# Patient Record
Sex: FEMALE | Race: BLACK OR AFRICAN AMERICAN | NOT HISPANIC OR LATINO
[De-identification: names, ages, dates, MRNs, and addresses within clinical notes are randomized per-mention and may not be internally consistent; named-entity substitution may affect disease eponyms.]

---

## 2018-02-26 ENCOUNTER — TRANSCRIPTION ENCOUNTER (OUTPATIENT)
Age: 48
End: 2018-02-26

## 2018-06-10 ENCOUNTER — EMERGENCY (EMERGENCY)
Facility: HOSPITAL | Age: 48
LOS: 0 days | Discharge: HOME | End: 2018-06-11
Attending: EMERGENCY MEDICINE | Admitting: EMERGENCY MEDICINE

## 2018-06-10 VITALS
HEIGHT: 62 IN | SYSTOLIC BLOOD PRESSURE: 117 MMHG | RESPIRATION RATE: 18 BRPM | TEMPERATURE: 98 F | HEART RATE: 71 BPM | OXYGEN SATURATION: 99 % | DIASTOLIC BLOOD PRESSURE: 78 MMHG | WEIGHT: 225.09 LBS

## 2018-06-10 DIAGNOSIS — M25.511 PAIN IN RIGHT SHOULDER: ICD-10-CM
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
## 2018-06-10 DIAGNOSIS — R51 HEADACHE: ICD-10-CM
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
## 2018-06-10 DIAGNOSIS — Z79.1 LONG TERM (CURRENT) USE OF NON-STEROIDAL ANTI-INFLAMMATORIES (NSAID): ICD-10-CM

## 2018-06-10 DIAGNOSIS — Z79.899 OTHER LONG TERM (CURRENT) DRUG THERAPY: ICD-10-CM

## 2018-06-10 DIAGNOSIS — M54.9 DORSALGIA, UNSPECIFIED: ICD-10-CM

## 2018-06-10 DIAGNOSIS — M62.838 OTHER MUSCLE SPASM: ICD-10-CM

## 2018-06-10 DIAGNOSIS — M54.2 CERVICALGIA: ICD-10-CM

## 2018-06-10 DIAGNOSIS — M54.6 PAIN IN THORACIC SPINE: ICD-10-CM

## 2018-06-10 RX ORDER — ACETAMINOPHEN 500 MG
975 TABLET ORAL ONCE
Qty: 0 | Refills: 0 | Status: COMPLETED | OUTPATIENT
Start: 2018-06-10 | End: 2018-06-10

## 2018-06-10 RX ADMIN — Medication 975 MILLIGRAM(S): at 23:21
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
## 2018-06-11 NOTE — ED PROVIDER NOTE - PHYSICAL EXAMINATION
VITAL SIGNS: I have reviewed the initial vital signs.   CONSTITUTIONAL: Awake, alert. Well-developed; well-nourished; in no distress. Non-toxic appearing.   SKIN: No rash, vesicles/lesion, abrasions or lacerations. No ecchymosis or signs of trauma.   HEAD: Normocephalic; atraumatic. No step offs or tenderness. No Bui Sign’s or Raccoon eyes.   NECK: Supple. + b/l Trapezius ttp.   EYES: Symmetrical, no discharge or signs of trauma. Conjunctiva and sclera clear. PERRL.   CARD: No seatbelt sign. No chest wall deformity or tenderness. S1, S2 normal; no murmurs, gallops, or rubs. Regular rate and rhythm.  RESP: Good air movement. Lungs CTAB. No crackles, wheezes, rales or rhonchi.  ABD: Soft; non-distended; non-tender.   EXT: No bony deformity. + right shoulder ttp. Normal ROM x 4 extremities. + lower cervical/upper thoracic midline spinal ttp.   NEURO: A&Ox3. GCS 15. Normal speech. CN 2-12 intact. Strength 5/5 UE/LE b/l. No sensory deficits. n/v intact UE/LE b/l, pulses symmetrical. Normal gait.  PSYCH: Cooperative, appropriate.

## 2018-06-11 NOTE — ED PROVIDER NOTE - NS ED ROS FT
Except as documented in HPI, all other ROS negative.   GENERAL: Denies fever/chills, loss of appetite/weight or fatigue.  SKIN: Denies rashes, abrasions, lacerations, ecchymosis, erythema, or edema.  HEAD: + headache.  EYES: Denies blurry vision, diplopia, or photophobia.  CARDIAC: Denies chest pain, palpitations, or SOB.   RESPIRATORY: Denies SOB, cough, hemoptysis or wheezing.   GI: Denies abdominal pain, n/v/d.   MSK: Denies myalgias, bony deformity or pain.   NEURO: Denies paresthesias, tingling or weakness.
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
## 2018-10-12 PROBLEM — R56.9 UNSPECIFIED CONVULSIONS: Chronic | Status: ACTIVE | Noted: 2018-06-10

## 2018-11-29 ENCOUNTER — OUTPATIENT (OUTPATIENT)
Dept: OUTPATIENT SERVICES | Facility: HOSPITAL | Age: 48
LOS: 1 days | Discharge: HOME | End: 2018-11-29

## 2018-11-29 ENCOUNTER — RESULT REVIEW (OUTPATIENT)
Age: 48
End: 2018-11-29

## 2018-11-29 VITALS
WEIGHT: 235.01 LBS | SYSTOLIC BLOOD PRESSURE: 150 MMHG | TEMPERATURE: 97 F | RESPIRATION RATE: 18 BRPM | HEART RATE: 68 BPM | OXYGEN SATURATION: 100 % | HEIGHT: 61 IN | DIASTOLIC BLOOD PRESSURE: 89 MMHG

## 2018-11-29 VITALS — HEART RATE: 58 BPM | DIASTOLIC BLOOD PRESSURE: 58 MMHG | RESPIRATION RATE: 18 BRPM | SYSTOLIC BLOOD PRESSURE: 109 MMHG

## 2018-11-29 DIAGNOSIS — D26.9 OTHER BENIGN NEOPLASM OF UTERUS, UNSPECIFIED: Chronic | ICD-10-CM

## 2018-11-29 NOTE — ASU DISCHARGE PLAN (ADULT/PEDIATRIC). - NOTIFY
Unable to Urinate/Swelling that continues/Persistent Nausea and Vomiting/Numbness, color, or temperature change to extremity/Bleeding that does not stop/Fever greater than 101

## 2018-11-30 LAB — SURGICAL PATHOLOGY STUDY: SIGNIFICANT CHANGE UP

## 2018-12-04 DIAGNOSIS — Z12.11 ENCOUNTER FOR SCREENING FOR MALIGNANT NEOPLASM OF COLON: ICD-10-CM

## 2018-12-04 DIAGNOSIS — K64.0 FIRST DEGREE HEMORRHOIDS: ICD-10-CM

## 2018-12-04 DIAGNOSIS — K57.30 DIVERTICULOSIS OF LARGE INTESTINE WITHOUT PERFORATION OR ABSCESS WITHOUT BLEEDING: ICD-10-CM

## 2018-12-04 DIAGNOSIS — R56.9 UNSPECIFIED CONVULSIONS: ICD-10-CM

## 2019-07-16 PROBLEM — Z91.89 OTHER SPECIFIED PERSONAL RISK FACTORS, NOT ELSEWHERE CLASSIFIED: Chronic | Status: ACTIVE | Noted: 2018-11-29

## 2019-09-16 PROBLEM — Z00.00 ENCOUNTER FOR PREVENTIVE HEALTH EXAMINATION: Status: ACTIVE | Noted: 2019-09-16

## 2019-10-01 ENCOUNTER — APPOINTMENT (OUTPATIENT)
Dept: GASTROENTEROLOGY | Facility: CLINIC | Age: 49
End: 2019-10-01
Payer: MEDICAID

## 2019-10-01 VITALS
HEIGHT: 61 IN | WEIGHT: 260 LBS | BODY MASS INDEX: 49.09 KG/M2 | DIASTOLIC BLOOD PRESSURE: 78 MMHG | HEART RATE: 70 BPM | SYSTOLIC BLOOD PRESSURE: 108 MMHG

## 2019-10-01 DIAGNOSIS — R14.0 ABDOMINAL DISTENSION (GASEOUS): ICD-10-CM

## 2019-10-01 PROCEDURE — 99214 OFFICE O/P EST MOD 30 MIN: CPT

## 2019-10-01 RX ORDER — ASPIRIN 81 MG
81 TABLET, DELAYED RELEASE (ENTERIC COATED) ORAL
Refills: 0 | Status: ACTIVE | COMMUNITY

## 2019-10-01 RX ORDER — MELATONIN 5 MG
5 CAPSULE ORAL
Refills: 0 | Status: ACTIVE | COMMUNITY

## 2019-10-01 RX ORDER — CHROMIUM 200 MCG
TABLET ORAL
Refills: 0 | Status: ACTIVE | COMMUNITY

## 2019-10-01 NOTE — PHYSICAL EXAM
[General Appearance - In No Acute Distress] : in no acute distress [General Appearance - Alert] : alert [General Appearance - Well Nourished] : well nourished [General Appearance - Well Developed] : well developed [General Appearance - Well-Appearing] : healthy appearing [Sclera] : the sclera and conjunctiva were normal [PERRL With Normal Accommodation] : pupils were equal in size, round, and reactive to light [Extraocular Movements] : extraocular movements were intact [Outer Ear] : the ears and nose were normal in appearance [Oropharynx] : the oropharynx was normal [Neck Appearance] : the appearance of the neck was normal [Neck Cervical Mass (___cm)] : no neck mass was observed [Jugular Venous Distention Increased] : there was no jugular-venous distention [Thyroid Diffuse Enlargement] : the thyroid was not enlarged [Thyroid Nodule] : there were no palpable thyroid nodules [Respiration, Rhythm And Depth] : normal respiratory rhythm and effort [Auscultation Breath Sounds / Voice Sounds] : lungs were clear to auscultation bilaterally [Bowel Sounds] : normal bowel sounds [Abdomen Soft] : soft [Abdomen Tenderness] : non-tender [Abdomen Mass (___ Cm)] : no abdominal mass palpated [Skin Color & Pigmentation] : normal skin color and pigmentation [Skin Turgor] : normal skin turgor [] : no rash [Oriented To Time, Place, And Person] : oriented to person, place, and time [Affect] : the affect was normal [Impaired Insight] : insight and judgment were intact

## 2019-11-12 ENCOUNTER — TRANSCRIPTION ENCOUNTER (OUTPATIENT)
Age: 49
End: 2019-11-12

## 2020-01-06 ENCOUNTER — TRANSCRIPTION ENCOUNTER (OUTPATIENT)
Age: 50
End: 2020-01-06

## 2020-08-18 ENCOUNTER — APPOINTMENT (OUTPATIENT)
Dept: GASTROENTEROLOGY | Facility: CLINIC | Age: 50
End: 2020-08-18
Payer: MEDICAID

## 2020-08-18 ENCOUNTER — TRANSCRIPTION ENCOUNTER (OUTPATIENT)
Age: 50
End: 2020-08-18

## 2020-08-18 DIAGNOSIS — K29.70 GASTRITIS, UNSPECIFIED, W/OUT BLEEDING: ICD-10-CM

## 2020-08-18 DIAGNOSIS — R10.13 EPIGASTRIC PAIN: ICD-10-CM

## 2020-08-18 PROCEDURE — 99214 OFFICE O/P EST MOD 30 MIN: CPT | Mod: 95

## 2020-08-18 NOTE — HISTORY OF PRESENT ILLNESS
[Home] : at home, [unfilled] , at the time of the visit. [Medical Office: ([facility])___] : at the medical office located in  [FreeTextEntry4] : Ivy Hernadez [de-identified] : 51 yo female with minimal colitis on Colonoscopy here for abdominal pain and shortness of breath. Eating almonds last week and felt patient couldn’t breathe. Patient started having abdominal cramps since Saturday with epigastric and LUQ pain, patient went to urgent care today. Rectal exam with fecal blood. Patient reports pain is 6/10, patient able to tolerate clear liquids. No fevers no chills The patient denies  or black or bloody stool. The patient denies nausea, vomiting , dysphagia, odynophagia, or melena.. +Epigastric and LUQ pain.

## 2020-09-08 ENCOUNTER — OUTPATIENT (OUTPATIENT)
Dept: OUTPATIENT SERVICES | Facility: HOSPITAL | Age: 50
LOS: 1 days | Discharge: HOME | End: 2020-09-08

## 2020-09-08 ENCOUNTER — LABORATORY RESULT (OUTPATIENT)
Age: 50
End: 2020-09-08

## 2020-09-08 DIAGNOSIS — Z11.59 ENCOUNTER FOR SCREENING FOR OTHER VIRAL DISEASES: ICD-10-CM

## 2020-09-08 DIAGNOSIS — D26.9 OTHER BENIGN NEOPLASM OF UTERUS, UNSPECIFIED: Chronic | ICD-10-CM

## 2020-09-13 ENCOUNTER — OUTPATIENT (OUTPATIENT)
Dept: OUTPATIENT SERVICES | Facility: HOSPITAL | Age: 50
LOS: 1 days | Discharge: HOME | End: 2020-09-13

## 2020-09-13 ENCOUNTER — LABORATORY RESULT (OUTPATIENT)
Age: 50
End: 2020-09-13

## 2020-09-13 DIAGNOSIS — D26.9 OTHER BENIGN NEOPLASM OF UTERUS, UNSPECIFIED: Chronic | ICD-10-CM

## 2020-09-13 DIAGNOSIS — Z11.59 ENCOUNTER FOR SCREENING FOR OTHER VIRAL DISEASES: ICD-10-CM

## 2020-09-16 ENCOUNTER — OUTPATIENT (OUTPATIENT)
Dept: OUTPATIENT SERVICES | Facility: HOSPITAL | Age: 50
LOS: 1 days | Discharge: HOME | End: 2020-09-16
Payer: MEDICAID

## 2020-09-16 ENCOUNTER — RESULT REVIEW (OUTPATIENT)
Age: 50
End: 2020-09-16

## 2020-09-16 ENCOUNTER — TRANSCRIPTION ENCOUNTER (OUTPATIENT)
Age: 50
End: 2020-09-16

## 2020-09-16 VITALS
SYSTOLIC BLOOD PRESSURE: 105 MMHG | DIASTOLIC BLOOD PRESSURE: 62 MMHG | WEIGHT: 235.01 LBS | HEIGHT: 61 IN | OXYGEN SATURATION: 100 % | HEART RATE: 64 BPM | TEMPERATURE: 97 F | RESPIRATION RATE: 20 BRPM

## 2020-09-16 VITALS — RESPIRATION RATE: 18 BRPM | SYSTOLIC BLOOD PRESSURE: 117 MMHG | HEART RATE: 54 BPM

## 2020-09-16 DIAGNOSIS — K62.5 HEMORRHAGE OF ANUS AND RECTUM: ICD-10-CM

## 2020-09-16 DIAGNOSIS — D26.9 OTHER BENIGN NEOPLASM OF UTERUS, UNSPECIFIED: Chronic | ICD-10-CM

## 2020-09-16 PROCEDURE — 43239 EGD BIOPSY SINGLE/MULTIPLE: CPT

## 2020-09-16 PROCEDURE — 88312 SPECIAL STAINS GROUP 1: CPT | Mod: 26

## 2020-09-16 PROCEDURE — 88305 TISSUE EXAM BY PATHOLOGIST: CPT | Mod: 26

## 2020-09-16 NOTE — ASU DISCHARGE PLAN (ADULT/PEDIATRIC) - CARE PROVIDER_API CALL
Saturnino Abarca)  Gastroenterology; Internal Medicine  [address] [place] [address]  Phone: [phone]  Fax: [phone]  Follow Up Time: 1 week

## 2020-09-16 NOTE — ASU PREOP CHECKLIST - ANTIBIOTIC
"Chief Complaint   Patient presents with     Consult       Initial /78 (BP Location: Right arm, Patient Position: Sitting, Cuff Size: Adult Large)  Pulse 60  Ht 5' 10.87\" (1.8 m)  Wt 214 lb 9.6 oz (97.3 kg)  SpO2 97%  BMI 30.04 kg/m2 Estimated body mass index is 30.04 kg/(m^2) as calculated from the following:    Height as of this encounter: 5' 10.87\" (1.8 m).    Weight as of this encounter: 214 lb 9.6 oz (97.3 kg).  Medication Reconciliation: complete   Aida Moore MA      " n/a

## 2020-09-16 NOTE — PRE-ANESTHESIA EVALUATION ADULT - NSANTHOSAYNRD_GEN_A_CORE
No. KALLIE screening performed.  STOP BANG Legend: 0-2 = LOW Risk; 3-4 = INTERMEDIATE Risk; 5-8 = HIGH Risk

## 2020-09-17 LAB — SURGICAL PATHOLOGY STUDY: SIGNIFICANT CHANGE UP

## 2020-09-18 DIAGNOSIS — R56.9 UNSPECIFIED CONVULSIONS: ICD-10-CM

## 2020-09-18 DIAGNOSIS — K29.80 DUODENITIS WITHOUT BLEEDING: ICD-10-CM

## 2020-09-18 DIAGNOSIS — K29.50 UNSPECIFIED CHRONIC GASTRITIS WITHOUT BLEEDING: ICD-10-CM

## 2020-09-18 DIAGNOSIS — R10.9 UNSPECIFIED ABDOMINAL PAIN: ICD-10-CM

## 2020-09-21 ENCOUNTER — LABORATORY RESULT (OUTPATIENT)
Age: 50
End: 2020-09-21

## 2020-09-21 ENCOUNTER — OUTPATIENT (OUTPATIENT)
Dept: OUTPATIENT SERVICES | Facility: HOSPITAL | Age: 50
LOS: 1 days | Discharge: HOME | End: 2020-09-21

## 2020-09-21 DIAGNOSIS — D26.9 OTHER BENIGN NEOPLASM OF UTERUS, UNSPECIFIED: Chronic | ICD-10-CM

## 2020-09-21 DIAGNOSIS — Z11.59 ENCOUNTER FOR SCREENING FOR OTHER VIRAL DISEASES: ICD-10-CM

## 2020-09-24 ENCOUNTER — TRANSCRIPTION ENCOUNTER (OUTPATIENT)
Age: 50
End: 2020-09-24

## 2020-09-24 ENCOUNTER — OUTPATIENT (OUTPATIENT)
Dept: OUTPATIENT SERVICES | Facility: HOSPITAL | Age: 50
LOS: 1 days | Discharge: HOME | End: 2020-09-24
Payer: MEDICAID

## 2020-09-24 ENCOUNTER — RESULT REVIEW (OUTPATIENT)
Age: 50
End: 2020-09-24

## 2020-09-24 VITALS — DIASTOLIC BLOOD PRESSURE: 66 MMHG | RESPIRATION RATE: 18 BRPM | SYSTOLIC BLOOD PRESSURE: 110 MMHG | HEART RATE: 59 BPM

## 2020-09-24 VITALS
RESPIRATION RATE: 18 BRPM | SYSTOLIC BLOOD PRESSURE: 105 MMHG | TEMPERATURE: 98 F | DIASTOLIC BLOOD PRESSURE: 69 MMHG | HEART RATE: 65 BPM

## 2020-09-24 DIAGNOSIS — K59.09 OTHER CONSTIPATION: ICD-10-CM

## 2020-09-24 DIAGNOSIS — Z98.890 OTHER SPECIFIED POSTPROCEDURAL STATES: Chronic | ICD-10-CM

## 2020-09-24 DIAGNOSIS — D26.9 OTHER BENIGN NEOPLASM OF UTERUS, UNSPECIFIED: Chronic | ICD-10-CM

## 2020-09-24 PROCEDURE — 45380 COLONOSCOPY AND BIOPSY: CPT

## 2020-09-24 PROCEDURE — 88305 TISSUE EXAM BY PATHOLOGIST: CPT | Mod: 26

## 2020-09-24 RX ORDER — DICLOFENAC SODIUM 75 MG/1
0 TABLET, DELAYED RELEASE ORAL
Qty: 0 | Refills: 0 | DISCHARGE

## 2020-09-24 RX ORDER — LAMOTRIGINE 25 MG/1
1 TABLET, ORALLY DISINTEGRATING ORAL
Qty: 0 | Refills: 0 | DISCHARGE

## 2020-09-24 RX ORDER — MELOXICAM 15 MG/1
1 TABLET ORAL
Qty: 0 | Refills: 0 | DISCHARGE

## 2020-09-24 RX ORDER — TIZANIDINE 4 MG/1
2 TABLET ORAL
Qty: 0 | Refills: 0 | DISCHARGE

## 2020-09-24 NOTE — ASU PATIENT PROFILE, ADULT - VISION (WITH CORRECTIVE LENSES IF THE PATIENT USUALLY WEARS THEM):
Self Glucose Testinw1d   Type 2      Date   Fasting   After Breakfast   After  Lunch    After  Supper      7-1 95 109 121 112    7-2 89 101 100 122    7-3 82 95 80 101    7-4 91 110 121 120    7-5 80 116 111 105    7-6 93 83 93 120    7-7 83 107 116 123    7-8 91 110 112 120    7-9 98           Patient has been taking her blood sugars 2 hours post meals.  Blood sugar numbers and regimen reviewed by Gina Mcclendon MD     Current Regimen:Metformin 1000 mg qam and 1500 mg qhs  Levemir 38 u at hs  Humalog 10U before dinner    Patient thought she was told to take 38U not 32 as previously documented. Patient aware to continue with 38U as her blood sugars are in the normal range.    Explained to patient that Robyn DORADO will be following patient regarding her diabetes but all her appointments and ultrasounds will be here in [place]. Patient given number to call today to establish with Robyn.  Patient agreeable to plan of care.     Normal vision: sees adequately in most situations; can see medication labels, newsprint

## 2020-09-24 NOTE — ASU PATIENT PROFILE, ADULT - PSH
Atypical polypoid adenomyoma of uterus    H/O breast biopsy  left 1990's  H/O colonoscopy  1 year ago

## 2020-09-24 NOTE — ASU PATIENT PROFILE, ADULT - PMH
Arthritis    At high risk for seizures    Back injury    Left knee injury  tear  Right shoulder injury  tear  Seizure

## 2020-09-24 NOTE — ASU DISCHARGE PLAN (ADULT/PEDIATRIC) - CARE PROVIDER_API CALL
Saturnino Abarca)  Gastroenterology; Internal Medicine  [address] [place] [address]  Phone: [phone]  Fax: [phone]  Established Patient  Follow Up Time: 2 weeks

## 2020-09-29 LAB — SURGICAL PATHOLOGY STUDY: SIGNIFICANT CHANGE UP

## 2020-09-30 DIAGNOSIS — K92.1 MELENA: ICD-10-CM

## 2020-09-30 DIAGNOSIS — K64.8 OTHER HEMORRHOIDS: ICD-10-CM

## 2020-09-30 DIAGNOSIS — K63.3 ULCER OF INTESTINE: ICD-10-CM

## 2020-09-30 DIAGNOSIS — K57.30 DIVERTICULOSIS OF LARGE INTESTINE WITHOUT PERFORATION OR ABSCESS WITHOUT BLEEDING: ICD-10-CM

## 2020-09-30 DIAGNOSIS — D12.5 BENIGN NEOPLASM OF SIGMOID COLON: ICD-10-CM

## 2021-04-20 ENCOUNTER — TRANSCRIPTION ENCOUNTER (OUTPATIENT)
Age: 51
End: 2021-04-20

## 2021-06-09 PROBLEM — M19.90 UNSPECIFIED OSTEOARTHRITIS, UNSPECIFIED SITE: Chronic | Status: ACTIVE | Noted: 2020-09-24

## 2021-06-09 PROBLEM — S39.92XA UNSPECIFIED INJURY OF LOWER BACK, INITIAL ENCOUNTER: Chronic | Status: ACTIVE | Noted: 2020-09-24

## 2021-06-09 PROBLEM — S49.91XA UNSPECIFIED INJURY OF RIGHT SHOULDER AND UPPER ARM, INITIAL ENCOUNTER: Chronic | Status: ACTIVE | Noted: 2020-09-24

## 2021-06-09 PROBLEM — S89.92XA UNSPECIFIED INJURY OF LEFT LOWER LEG, INITIAL ENCOUNTER: Chronic | Status: ACTIVE | Noted: 2020-09-24

## 2021-06-13 ENCOUNTER — TRANSCRIPTION ENCOUNTER (OUTPATIENT)
Age: 51
End: 2021-06-13

## 2021-06-13 ENCOUNTER — EMERGENCY (EMERGENCY)
Facility: HOSPITAL | Age: 51
LOS: 0 days | Discharge: HOME | End: 2021-06-13
Attending: EMERGENCY MEDICINE | Admitting: EMERGENCY MEDICINE
Payer: MEDICAID

## 2021-06-13 VITALS
WEIGHT: 250 LBS | RESPIRATION RATE: 18 BRPM | OXYGEN SATURATION: 99 % | HEART RATE: 60 BPM | SYSTOLIC BLOOD PRESSURE: 127 MMHG | TEMPERATURE: 98 F | HEIGHT: 61 IN | DIASTOLIC BLOOD PRESSURE: 71 MMHG

## 2021-06-13 VITALS — TEMPERATURE: 98 F | DIASTOLIC BLOOD PRESSURE: 68 MMHG | SYSTOLIC BLOOD PRESSURE: 120 MMHG | HEART RATE: 53 BPM

## 2021-06-13 DIAGNOSIS — Z79.82 LONG TERM (CURRENT) USE OF ASPIRIN: ICD-10-CM

## 2021-06-13 DIAGNOSIS — R07.89 OTHER CHEST PAIN: ICD-10-CM

## 2021-06-13 DIAGNOSIS — Z79.899 OTHER LONG TERM (CURRENT) DRUG THERAPY: ICD-10-CM

## 2021-06-13 DIAGNOSIS — G40.909 EPILEPSY, UNSPECIFIED, NOT INTRACTABLE, WITHOUT STATUS EPILEPTICUS: ICD-10-CM

## 2021-06-13 DIAGNOSIS — Z98.890 OTHER SPECIFIED POSTPROCEDURAL STATES: Chronic | ICD-10-CM

## 2021-06-13 DIAGNOSIS — D26.9 OTHER BENIGN NEOPLASM OF UTERUS, UNSPECIFIED: Chronic | ICD-10-CM

## 2021-06-13 DIAGNOSIS — M19.90 UNSPECIFIED OSTEOARTHRITIS, UNSPECIFIED SITE: ICD-10-CM

## 2021-06-13 LAB
ALBUMIN SERPL ELPH-MCNC: 4.3 G/DL — SIGNIFICANT CHANGE UP (ref 3.5–5.2)
ALP SERPL-CCNC: 76 U/L — SIGNIFICANT CHANGE UP (ref 30–115)
ALT FLD-CCNC: 8 U/L — SIGNIFICANT CHANGE UP (ref 0–41)
ANION GAP SERPL CALC-SCNC: 8 MMOL/L — SIGNIFICANT CHANGE UP (ref 7–14)
AST SERPL-CCNC: 12 U/L — SIGNIFICANT CHANGE UP (ref 0–41)
BASOPHILS # BLD AUTO: 0.01 K/UL — SIGNIFICANT CHANGE UP (ref 0–0.2)
BASOPHILS NFR BLD AUTO: 0.2 % — SIGNIFICANT CHANGE UP (ref 0–1)
BILIRUB SERPL-MCNC: 0.2 MG/DL — SIGNIFICANT CHANGE UP (ref 0.2–1.2)
BUN SERPL-MCNC: 13 MG/DL — SIGNIFICANT CHANGE UP (ref 10–20)
CALCIUM SERPL-MCNC: 9.3 MG/DL — SIGNIFICANT CHANGE UP (ref 8.5–10.1)
CHLORIDE SERPL-SCNC: 101 MMOL/L — SIGNIFICANT CHANGE UP (ref 98–110)
CO2 SERPL-SCNC: 28 MMOL/L — SIGNIFICANT CHANGE UP (ref 17–32)
CREAT SERPL-MCNC: 0.9 MG/DL — SIGNIFICANT CHANGE UP (ref 0.7–1.5)
D DIMER BLD IA.RAPID-MCNC: 58 NG/ML DDU — SIGNIFICANT CHANGE UP (ref 0–230)
EOSINOPHIL # BLD AUTO: 0.04 K/UL — SIGNIFICANT CHANGE UP (ref 0–0.7)
EOSINOPHIL NFR BLD AUTO: 0.8 % — SIGNIFICANT CHANGE UP (ref 0–8)
GLUCOSE SERPL-MCNC: 84 MG/DL — SIGNIFICANT CHANGE UP (ref 70–99)
HCG SERPL QL: NEGATIVE — SIGNIFICANT CHANGE UP
HCT VFR BLD CALC: 37.5 % — SIGNIFICANT CHANGE UP (ref 37–47)
HGB BLD-MCNC: 12.3 G/DL — SIGNIFICANT CHANGE UP (ref 12–16)
IMM GRANULOCYTES NFR BLD AUTO: 0.2 % — SIGNIFICANT CHANGE UP (ref 0.1–0.3)
LYMPHOCYTES # BLD AUTO: 2.22 K/UL — SIGNIFICANT CHANGE UP (ref 1.2–3.4)
LYMPHOCYTES # BLD AUTO: 47.1 % — SIGNIFICANT CHANGE UP (ref 20.5–51.1)
MCHC RBC-ENTMCNC: 29.3 PG — SIGNIFICANT CHANGE UP (ref 27–31)
MCHC RBC-ENTMCNC: 32.8 G/DL — SIGNIFICANT CHANGE UP (ref 32–37)
MCV RBC AUTO: 89.3 FL — SIGNIFICANT CHANGE UP (ref 81–99)
MONOCYTES # BLD AUTO: 0.47 K/UL — SIGNIFICANT CHANGE UP (ref 0.1–0.6)
MONOCYTES NFR BLD AUTO: 10 % — HIGH (ref 1.7–9.3)
NEUTROPHILS # BLD AUTO: 1.96 K/UL — SIGNIFICANT CHANGE UP (ref 1.4–6.5)
NEUTROPHILS NFR BLD AUTO: 41.7 % — LOW (ref 42.2–75.2)
NRBC # BLD: 0 /100 WBCS — SIGNIFICANT CHANGE UP (ref 0–0)
PLATELET # BLD AUTO: 257 K/UL — SIGNIFICANT CHANGE UP (ref 130–400)
POTASSIUM SERPL-MCNC: 4.2 MMOL/L — SIGNIFICANT CHANGE UP (ref 3.5–5)
POTASSIUM SERPL-SCNC: 4.2 MMOL/L — SIGNIFICANT CHANGE UP (ref 3.5–5)
PROT SERPL-MCNC: 6.8 G/DL — SIGNIFICANT CHANGE UP (ref 6–8)
RBC # BLD: 4.2 M/UL — SIGNIFICANT CHANGE UP (ref 4.2–5.4)
RBC # FLD: 12.7 % — SIGNIFICANT CHANGE UP (ref 11.5–14.5)
SODIUM SERPL-SCNC: 137 MMOL/L — SIGNIFICANT CHANGE UP (ref 135–146)
TROPONIN T SERPL-MCNC: <0.01 NG/ML — SIGNIFICANT CHANGE UP
WBC # BLD: 4.71 K/UL — LOW (ref 4.8–10.8)
WBC # FLD AUTO: 4.71 K/UL — LOW (ref 4.8–10.8)

## 2021-06-13 PROCEDURE — 93010 ELECTROCARDIOGRAM REPORT: CPT

## 2021-06-13 PROCEDURE — 99285 EMERGENCY DEPT VISIT HI MDM: CPT

## 2021-06-13 PROCEDURE — 71045 X-RAY EXAM CHEST 1 VIEW: CPT | Mod: 26

## 2021-06-13 NOTE — ED PROVIDER NOTE - PATIENT PORTAL LINK FT
You can access the FollowMyHealth Patient Portal offered by [facility] by registering at the following website: [email or web address]. By joining [employer]’s FollowMyHealth portal, you will also be able to view your health information using other applications (apps) compatible with our system.

## 2021-06-13 NOTE — ED PROVIDER NOTE - PROGRESS NOTE DETAILS
SC: Labs, EKG, CXR reassuring. PT offered observation but states that she does not want to stay. Strict return precautions were advised for worsening chest pain, new or worsening sx.    HEART Score for Major Adverse Cardiac Events (MACE) =2    History suspicion: slight (0)/moderate (1)/high (2)  EKG: normal (0)/non-specific repolarization disturbance (1)/significant ST depression (2)  Age: <45 (0)/45-64 (1)/=65 (2)  Risk factors (HTN, hypercholesterolemia, DM, obesity, smoking, family history, atherosclerosis): 0 (0)/1-2 (1)/=3 (2)  Troponin: normal (0)/1-2x normal limit (1)/>2x normal limit (2)    ***Interpretation  Risk at 6 weeks of MACE (all-cause mortality, myocardial infarction, or coronary revascularization) is:  0-3 Points: 0.9-1.7%  4-6 Points: 12-16.6%  7-10 Points: 50-65%  ***

## 2021-06-13 NOTE — ED PROVIDER NOTE - OBJECTIVE STATEMENT
51F with past medical history of epilepsy on keppra and lamictil presents with moderate, continuous, nonradiating chest pressure beginning at 7AM after waking. Denies fever, chills, dysuria, shortness of breath, n/v, abdominal pain, use of exogenous hormones, LE pain or swelling. Recent negative nuc stress and treadmill test with Dr. Allen in [place] 3 mo ago.

## 2021-06-13 NOTE — ED PROVIDER NOTE - ATTENDING CONTRIBUTION TO CARE
51 year old female, pmhx as documented presenting with chest pain that began at 7am this morning described as tight, substernal, non-radiating, no palliative or provocative factors, mild severity. Patient has had nuclear stress test 3 months ago which was negative. Otherwise denies fevers, dyspnea, palpitations, N/V/D, blood in stool, abdominal pain or leg swelling. Patient states pain has largely resolved.    Vital Signs: I have reviewed the initial vital signs.  Constitutional: NAD, well-nourished, appears stated age, no acute distress.  HEENT: Airway patent, moist MM, no erythema/swelling/deformity of oral structures. EOMI, PERRLA.  CV: regular rate, regular rhythm, well-perfused extremities, 2+ b/l DP and radial pulses equal.  Lungs: BCTA, no increased WOB.  ABD: NTND, no guarding or rebound, no pulsatile mass, no hernias.   MSK: Neck supple, nontender, nl ROM, no stepoff. Chest nontender. Back nontender in TLS spine or to b/l bony structures or flanks. Ext nontender, nl rom, no deformity.   INTEG: Skin warm, dry, no rash.  NEURO: A&Ox3, normal strength, nl sensation throughout, normal speech.   PSYCH: Calm, cooperative, normal affect and interaction.    Will obtain EKG, CXR, labs, re-eval.

## 2021-06-13 NOTE — ED PROVIDER NOTE - NSFOLLOWUPCLINICS_GEN_ALL_ED_FT
[facility]  Medicine  [address], [place]   Phone: [phone]  Fax:   Follow Up Time: 1-3 Days

## 2021-06-13 NOTE — ED PROVIDER NOTE - PHYSICAL EXAMINATION
CONSTITUTIONAL: in no acute distress.   SKIN: warm, dry  HEAD: Normocephalic.  EYES: PERRL.  ENT: Airway clear.  NECK: Supple.  LYMPH: No acute cervical adenopathy.  CARD: Regular rate and rhythm.   RESP: No wheezing, rales or rhonchi.  ABD: soft ntnd  EXT: No clubbing, cyanosis. No LE TTP  NEURO: Alert, oriented.  PSYCH: Cooperative, appropriate.

## 2021-06-13 NOTE — ED PROVIDER NOTE - CLINICAL SUMMARY MEDICAL DECISION MAKING FREE TEXT BOX
Patient presented with chest pain since this AM. Otherwise afebrile, Hd stable, well appearing. EKG obtained and non-ischemic without evidence of STEMI. Obtained labs which were grossly unremarkable including no significant leukocytosis, anemia, signs of dehydration/JOHN, transaminitis or significant electrolyte abnormalities. Trop and d-dimer negative. Chest xray negative for pneumothorax, pneumonia, widened mediastinum, evidence of rib fractures, enlarged cardiac silhouette or any other emergent pathologies. Patient remained without recurrence of chest pain or abnormalities during monitoring in ED. Ambulatory without difficulty, tolerates PO. HEART Score 1 with recent negative nuclear stress testing, and no concern clinically for dissection. Given the above, will discharge home with outpatient follow up. Patient agreeable with plan. Agrees to return to ED for any new or worsening symptoms.

## 2021-06-25 NOTE — ASU PATIENT PROFILE, ADULT - MEDICATION ADMINISTRATION INFO, PROFILE
Lab Results   Component Value Date    CHOL 135 05/08/2019    CHOL 83 05/08/2019    TRIG 94 05/08/2019    HDL 52 05/08/2019    HDL 52 05/21/2012    LDLCALC 65 05/08/2019     Meds~ crestor / CoQ 10  Plan~ no changes no concerns

## 2021-07-01 ENCOUNTER — APPOINTMENT (OUTPATIENT)
Dept: OTOLARYNGOLOGY | Facility: CLINIC | Age: 51
End: 2021-07-01
Payer: MEDICAID

## 2021-07-01 VITALS — HEIGHT: 61 IN | WEIGHT: 250 LBS | BODY MASS INDEX: 47.2 KG/M2

## 2021-07-01 DIAGNOSIS — H69.81 OTHER SPECIFIED DISORDERS OF EUSTACHIAN TUBE, RIGHT EAR: ICD-10-CM

## 2021-07-01 DIAGNOSIS — J34.3 HYPERTROPHY OF NASAL TURBINATES: ICD-10-CM

## 2021-07-01 DIAGNOSIS — J34.89 OTHER SPECIFIED DISORDERS OF NOSE AND NASAL SINUSES: ICD-10-CM

## 2021-07-01 DIAGNOSIS — H91.91 UNSPECIFIED HEARING LOSS, RIGHT EAR: ICD-10-CM

## 2021-07-01 DIAGNOSIS — H66.90 OTITIS MEDIA, UNSPECIFIED, UNSPECIFIED EAR: ICD-10-CM

## 2021-07-01 PROCEDURE — 31231 NASAL ENDOSCOPY DX: CPT

## 2021-07-01 PROCEDURE — 92557 COMPREHENSIVE HEARING TEST: CPT

## 2021-07-01 PROCEDURE — 92550 TYMPANOMETRY & REFLEX THRESH: CPT

## 2021-07-01 PROCEDURE — 99204 OFFICE O/P NEW MOD 45 MIN: CPT | Mod: 25

## 2021-07-01 NOTE — HISTORY OF PRESENT ILLNESS
[de-identified] : Patient present  today c/o ear infections. On 5/1 she had bilateral ear infections, the left worse that the right. Given   Augmentin 875 mg and  Zyrtec.  She continues to feel right ear has been clogged and has ear pain.\par She also c/o right sided nasal congestion. She used flonase for 2 weeks with no improvement

## 2021-07-01 NOTE — PHYSICAL EXAM
[Nasal Endoscopy Performed] : nasal endoscopy was performed, see procedure section for findings [Midline] : trachea located in midline position [Normal] : no abnormal secretions [de-identified] : severe edema

## 2021-07-01 NOTE — REASON FOR VISIT
[Initial Evaluation] : an initial evaluation for [FreeTextEntry2] : ear infections and nasal congestion

## 2021-07-19 NOTE — ED PROVIDER NOTE - NSCAREINITIATED _GEN_ER
Vascular Surgery Discharge Instructions: Care after your Fistula Procedure    Activity  For the next 24 hours:  Follow these guidelines related to the sedation medicine that you've received.   · You must have someone drive you home.  · You may feel tired, unsteady, or not quite yourself for the remainder of the day due to the sedation medicine. Your body gets rid of the medicine usually in 24 hours.  · Do not drive or operate machinery or power tools.  · Do not drink alcohol or smoke.  · Do not make any important decisions or sign important papers.    Follow these guidelines related to the surgery site  · No pushing, pulling, strenuous activity or heavy lifting (over 10 lbs) for 2 days with the arm used for the procedure.  · No driving for 2 days following the procedure.    Care of the procedure site.  · You may remove the dressing 48 hours after your procedure. Once the dressing is removed you can wash the access site with soap and water and pay gently dry. There is no specific puncture site/incision care required. Keep the site clean and dry.    Common side effects you may notice  · Soreness at the puncture site.  · Slight bruising at the site for several days to several weeks.  · Lump the size of a pea or marble at the puncture site for a few weeks.    Diet/Fluids  · Resume the same diet as prior to admission.    Call your doctor if you have  · Numbness, tingling, color change or swelling in the arm used for the procedure.  · Increasing pain near the procedure site.  · A temperature greater than 100.5F degrees.  · Redness or drainage around the procedure site.  · If you have questions, call your doctor.    *Your dialysis center may continue to use your left arm fistula for dialysis.     *Dr. Hdez's office will call you to schedule your next fistula procedure.       Dr. Ricky Hdez  [phone]     Marva Madrid)

## 2021-07-21 ENCOUNTER — APPOINTMENT (OUTPATIENT)
Dept: OTOLARYNGOLOGY | Facility: CLINIC | Age: 51
End: 2021-07-21

## 2022-01-28 ENCOUNTER — TRANSCRIPTION ENCOUNTER (OUTPATIENT)
Age: 52
End: 2022-01-28

## 2022-04-03 ENCOUNTER — INPATIENT (INPATIENT)
Facility: HOSPITAL | Age: 52
LOS: 1 days | Discharge: HOME | End: 2022-04-05
Attending: INTERNAL MEDICINE | Admitting: INTERNAL MEDICINE
Payer: MEDICARE

## 2022-04-03 VITALS
DIASTOLIC BLOOD PRESSURE: 60 MMHG | HEART RATE: 70 BPM | OXYGEN SATURATION: 100 % | RESPIRATION RATE: 18 BRPM | TEMPERATURE: 98 F | SYSTOLIC BLOOD PRESSURE: 123 MMHG | HEIGHT: 61 IN | WEIGHT: 262.35 LBS

## 2022-04-03 DIAGNOSIS — Z98.890 OTHER SPECIFIED POSTPROCEDURAL STATES: Chronic | ICD-10-CM

## 2022-04-03 DIAGNOSIS — D26.9 OTHER BENIGN NEOPLASM OF UTERUS, UNSPECIFIED: Chronic | ICD-10-CM

## 2022-04-03 LAB
ALBUMIN SERPL ELPH-MCNC: 4.2 G/DL — SIGNIFICANT CHANGE UP (ref 3.5–5.2)
ALBUMIN SERPL ELPH-MCNC: 4.3 G/DL — SIGNIFICANT CHANGE UP (ref 3.5–5.2)
ALP SERPL-CCNC: 93 U/L — SIGNIFICANT CHANGE UP (ref 30–115)
ALP SERPL-CCNC: 98 U/L — SIGNIFICANT CHANGE UP (ref 30–115)
ALT FLD-CCNC: 10 U/L — SIGNIFICANT CHANGE UP (ref 0–41)
ALT FLD-CCNC: 11 U/L — SIGNIFICANT CHANGE UP (ref 0–41)
ANION GAP SERPL CALC-SCNC: 10 MMOL/L — SIGNIFICANT CHANGE UP (ref 7–14)
ANION GAP SERPL CALC-SCNC: 11 MMOL/L — SIGNIFICANT CHANGE UP (ref 7–14)
APTT BLD: 32.6 SEC — SIGNIFICANT CHANGE UP (ref 27–39.2)
AST SERPL-CCNC: 13 U/L — SIGNIFICANT CHANGE UP (ref 0–41)
AST SERPL-CCNC: 13 U/L — SIGNIFICANT CHANGE UP (ref 0–41)
BASOPHILS # BLD AUTO: 0.02 K/UL — SIGNIFICANT CHANGE UP (ref 0–0.2)
BASOPHILS # BLD AUTO: 0.02 K/UL — SIGNIFICANT CHANGE UP (ref 0–0.2)
BASOPHILS NFR BLD AUTO: 0.3 % — SIGNIFICANT CHANGE UP (ref 0–1)
BASOPHILS NFR BLD AUTO: 0.3 % — SIGNIFICANT CHANGE UP (ref 0–1)
BILIRUB SERPL-MCNC: 0.3 MG/DL — SIGNIFICANT CHANGE UP (ref 0.2–1.2)
BILIRUB SERPL-MCNC: 0.3 MG/DL — SIGNIFICANT CHANGE UP (ref 0.2–1.2)
BUN SERPL-MCNC: 15 MG/DL — SIGNIFICANT CHANGE UP (ref 10–20)
BUN SERPL-MCNC: 19 MG/DL — SIGNIFICANT CHANGE UP (ref 10–20)
CALCIUM SERPL-MCNC: 9.1 MG/DL — SIGNIFICANT CHANGE UP (ref 8.5–10.1)
CALCIUM SERPL-MCNC: 9.4 MG/DL — SIGNIFICANT CHANGE UP (ref 8.5–10.1)
CHLORIDE SERPL-SCNC: 103 MMOL/L — SIGNIFICANT CHANGE UP (ref 98–110)
CHLORIDE SERPL-SCNC: 99 MMOL/L — SIGNIFICANT CHANGE UP (ref 98–110)
CHOLEST SERPL-MCNC: 120 MG/DL — SIGNIFICANT CHANGE UP
CO2 SERPL-SCNC: 23 MMOL/L — SIGNIFICANT CHANGE UP (ref 17–32)
CO2 SERPL-SCNC: 24 MMOL/L — SIGNIFICANT CHANGE UP (ref 17–32)
CREAT SERPL-MCNC: 1.1 MG/DL — SIGNIFICANT CHANGE UP (ref 0.7–1.5)
CREAT SERPL-MCNC: 1.2 MG/DL — SIGNIFICANT CHANGE UP (ref 0.7–1.5)
EGFR: 54 ML/MIN/1.73M2 — LOW
EGFR: 60 ML/MIN/1.73M2 — SIGNIFICANT CHANGE UP
EOSINOPHIL # BLD AUTO: 0.12 K/UL — SIGNIFICANT CHANGE UP (ref 0–0.7)
EOSINOPHIL # BLD AUTO: 0.18 K/UL — SIGNIFICANT CHANGE UP (ref 0–0.7)
EOSINOPHIL NFR BLD AUTO: 1.8 % — SIGNIFICANT CHANGE UP (ref 0–8)
EOSINOPHIL NFR BLD AUTO: 2.3 % — SIGNIFICANT CHANGE UP (ref 0–8)
GLUCOSE SERPL-MCNC: 90 MG/DL — SIGNIFICANT CHANGE UP (ref 70–99)
GLUCOSE SERPL-MCNC: 92 MG/DL — SIGNIFICANT CHANGE UP (ref 70–99)
HCT VFR BLD CALC: 36.1 % — LOW (ref 37–47)
HCT VFR BLD CALC: 37.3 % — SIGNIFICANT CHANGE UP (ref 37–47)
HDLC SERPL-MCNC: 59 MG/DL — SIGNIFICANT CHANGE UP
HGB BLD-MCNC: 11.6 G/DL — LOW (ref 12–16)
HGB BLD-MCNC: 12.1 G/DL — SIGNIFICANT CHANGE UP (ref 12–16)
IMM GRANULOCYTES NFR BLD AUTO: 0.2 % — SIGNIFICANT CHANGE UP (ref 0.1–0.3)
IMM GRANULOCYTES NFR BLD AUTO: 0.3 % — SIGNIFICANT CHANGE UP (ref 0.1–0.3)
INR BLD: 1.03 RATIO — SIGNIFICANT CHANGE UP (ref 0.65–1.3)
LIPID PNL WITH DIRECT LDL SERPL: 57 MG/DL — SIGNIFICANT CHANGE UP
LYMPHOCYTES # BLD AUTO: 2.53 K/UL — SIGNIFICANT CHANGE UP (ref 1.2–3.4)
LYMPHOCYTES # BLD AUTO: 3.08 K/UL — SIGNIFICANT CHANGE UP (ref 1.2–3.4)
LYMPHOCYTES # BLD AUTO: 38.3 % — SIGNIFICANT CHANGE UP (ref 20.5–51.1)
LYMPHOCYTES # BLD AUTO: 39.2 % — SIGNIFICANT CHANGE UP (ref 20.5–51.1)
MAGNESIUM SERPL-MCNC: 2 MG/DL — SIGNIFICANT CHANGE UP (ref 1.8–2.4)
MCHC RBC-ENTMCNC: 28.3 PG — SIGNIFICANT CHANGE UP (ref 27–31)
MCHC RBC-ENTMCNC: 29 PG — SIGNIFICANT CHANGE UP (ref 27–31)
MCHC RBC-ENTMCNC: 32.1 G/DL — SIGNIFICANT CHANGE UP (ref 32–37)
MCHC RBC-ENTMCNC: 32.4 G/DL — SIGNIFICANT CHANGE UP (ref 32–37)
MCV RBC AUTO: 88 FL — SIGNIFICANT CHANGE UP (ref 81–99)
MCV RBC AUTO: 89.4 FL — SIGNIFICANT CHANGE UP (ref 81–99)
MONOCYTES # BLD AUTO: 0.52 K/UL — SIGNIFICANT CHANGE UP (ref 0.1–0.6)
MONOCYTES # BLD AUTO: 0.63 K/UL — HIGH (ref 0.1–0.6)
MONOCYTES NFR BLD AUTO: 7.9 % — SIGNIFICANT CHANGE UP (ref 1.7–9.3)
MONOCYTES NFR BLD AUTO: 8 % — SIGNIFICANT CHANGE UP (ref 1.7–9.3)
NEUTROPHILS # BLD AUTO: 3.41 K/UL — SIGNIFICANT CHANGE UP (ref 1.4–6.5)
NEUTROPHILS # BLD AUTO: 3.92 K/UL — SIGNIFICANT CHANGE UP (ref 1.4–6.5)
NEUTROPHILS NFR BLD AUTO: 49.9 % — SIGNIFICANT CHANGE UP (ref 42.2–75.2)
NEUTROPHILS NFR BLD AUTO: 51.5 % — SIGNIFICANT CHANGE UP (ref 42.2–75.2)
NON HDL CHOLESTEROL: 61 MG/DL — SIGNIFICANT CHANGE UP
NRBC # BLD: 0 /100 WBCS — SIGNIFICANT CHANGE UP (ref 0–0)
NRBC # BLD: 0 /100 WBCS — SIGNIFICANT CHANGE UP (ref 0–0)
PLATELET # BLD AUTO: 281 K/UL — SIGNIFICANT CHANGE UP (ref 130–400)
PLATELET # BLD AUTO: 283 K/UL — SIGNIFICANT CHANGE UP (ref 130–400)
POTASSIUM SERPL-MCNC: 4 MMOL/L — SIGNIFICANT CHANGE UP (ref 3.5–5)
POTASSIUM SERPL-MCNC: 4.3 MMOL/L — SIGNIFICANT CHANGE UP (ref 3.5–5)
POTASSIUM SERPL-SCNC: 4 MMOL/L — SIGNIFICANT CHANGE UP (ref 3.5–5)
POTASSIUM SERPL-SCNC: 4.3 MMOL/L — SIGNIFICANT CHANGE UP (ref 3.5–5)
PROT SERPL-MCNC: 6.5 G/DL — SIGNIFICANT CHANGE UP (ref 6–8)
PROT SERPL-MCNC: 6.6 G/DL — SIGNIFICANT CHANGE UP (ref 6–8)
PROTHROM AB SERPL-ACNC: 11.8 SEC — SIGNIFICANT CHANGE UP (ref 9.95–12.87)
RBC # BLD: 4.1 M/UL — LOW (ref 4.2–5.4)
RBC # BLD: 4.17 M/UL — LOW (ref 4.2–5.4)
RBC # FLD: 13.4 % — SIGNIFICANT CHANGE UP (ref 11.5–14.5)
RBC # FLD: 13.5 % — SIGNIFICANT CHANGE UP (ref 11.5–14.5)
SARS-COV-2 RNA SPEC QL NAA+PROBE: SIGNIFICANT CHANGE UP
SODIUM SERPL-SCNC: 133 MMOL/L — LOW (ref 135–146)
SODIUM SERPL-SCNC: 137 MMOL/L — SIGNIFICANT CHANGE UP (ref 135–146)
TRIGL SERPL-MCNC: 40 MG/DL — SIGNIFICANT CHANGE UP
TROPONIN T SERPL-MCNC: <0.01 NG/ML — SIGNIFICANT CHANGE UP
WBC # BLD: 6.61 K/UL — SIGNIFICANT CHANGE UP (ref 4.8–10.8)
WBC # BLD: 7.85 K/UL — SIGNIFICANT CHANGE UP (ref 4.8–10.8)
WBC # FLD AUTO: 6.61 K/UL — SIGNIFICANT CHANGE UP (ref 4.8–10.8)
WBC # FLD AUTO: 7.85 K/UL — SIGNIFICANT CHANGE UP (ref 4.8–10.8)

## 2022-04-03 PROCEDURE — 70498 CT ANGIOGRAPHY NECK: CPT | Mod: 26

## 2022-04-03 PROCEDURE — 70450 CT HEAD/BRAIN W/O DYE: CPT | Mod: 26,MA,59

## 2022-04-03 PROCEDURE — 99291 CRITICAL CARE FIRST HOUR: CPT

## 2022-04-03 PROCEDURE — 99233 SBSQ HOSP IP/OBS HIGH 50: CPT

## 2022-04-03 PROCEDURE — 0042T: CPT

## 2022-04-03 PROCEDURE — G0426: CPT

## 2022-04-03 PROCEDURE — ZZZZZ: CPT

## 2022-04-03 PROCEDURE — 70496 CT ANGIOGRAPHY HEAD: CPT | Mod: 26

## 2022-04-03 PROCEDURE — 73030 X-RAY EXAM OF SHOULDER: CPT | Mod: 26,RT

## 2022-04-03 PROCEDURE — 99221 1ST HOSP IP/OBS SF/LOW 40: CPT

## 2022-04-03 PROCEDURE — 93010 ELECTROCARDIOGRAM REPORT: CPT

## 2022-04-03 RX ORDER — FREMANEZUMAB-VFRM 225 MG/1.5ML
1.5 INJECTION SUBCUTANEOUS
Qty: 0 | Refills: 0 | DISCHARGE

## 2022-04-03 RX ORDER — LAMOTRIGINE 25 MG/1
250 TABLET, ORALLY DISINTEGRATING ORAL
Qty: 0 | Refills: 0 | DISCHARGE

## 2022-04-03 RX ORDER — LEVETIRACETAM 250 MG/1
1 TABLET, FILM COATED ORAL
Qty: 0 | Refills: 0 | DISCHARGE

## 2022-04-03 RX ORDER — SODIUM CHLORIDE 9 MG/ML
500 INJECTION INTRAMUSCULAR; INTRAVENOUS; SUBCUTANEOUS ONCE
Refills: 0 | Status: COMPLETED | OUTPATIENT
Start: 2022-04-03 | End: 2022-04-03

## 2022-04-03 RX ORDER — LEVETIRACETAM 250 MG/1
1250 TABLET, FILM COATED ORAL
Refills: 0 | Status: DISCONTINUED | OUTPATIENT
Start: 2022-04-03 | End: 2022-04-05

## 2022-04-03 RX ORDER — MAGNESIUM OXIDE 400 MG ORAL TABLET 241.3 MG
1 TABLET ORAL
Qty: 0 | Refills: 0 | DISCHARGE

## 2022-04-03 RX ORDER — SODIUM CHLORIDE 9 MG/ML
1000 INJECTION INTRAMUSCULAR; INTRAVENOUS; SUBCUTANEOUS
Refills: 0 | Status: DISCONTINUED | OUTPATIENT
Start: 2022-04-03 | End: 2022-04-04

## 2022-04-03 RX ORDER — CHLORHEXIDINE GLUCONATE 213 G/1000ML
1 SOLUTION TOPICAL
Refills: 0 | Status: DISCONTINUED | OUTPATIENT
Start: 2022-04-03 | End: 2022-04-05

## 2022-04-03 RX ORDER — ALTEPLASE 100 MG
81 KIT INTRAVENOUS ONCE
Refills: 0 | Status: COMPLETED | OUTPATIENT
Start: 2022-04-03 | End: 2022-04-03

## 2022-04-03 RX ORDER — LAMOTRIGINE 25 MG/1
250 TABLET, ORALLY DISINTEGRATING ORAL DAILY
Refills: 0 | Status: DISCONTINUED | OUTPATIENT
Start: 2022-04-03 | End: 2022-04-05

## 2022-04-03 RX ORDER — LANOLIN ALCOHOL/MO/W.PET/CERES
1 CREAM (GRAM) TOPICAL
Qty: 0 | Refills: 0 | DISCHARGE

## 2022-04-03 RX ORDER — ATORVASTATIN CALCIUM 80 MG/1
80 TABLET, FILM COATED ORAL AT BEDTIME
Refills: 0 | Status: DISCONTINUED | OUTPATIENT
Start: 2022-04-03 | End: 2022-04-05

## 2022-04-03 RX ORDER — ALTEPLASE 100 MG
9 KIT INTRAVENOUS ONCE
Refills: 0 | Status: COMPLETED | OUTPATIENT
Start: 2022-04-03 | End: 2022-04-03

## 2022-04-03 RX ORDER — PROPRANOLOL HCL 160 MG
1 CAPSULE, EXTENDED RELEASE 24HR ORAL
Qty: 0 | Refills: 0 | DISCHARGE

## 2022-04-03 RX ADMIN — SODIUM CHLORIDE 500 MILLILITER(S): 9 INJECTION INTRAMUSCULAR; INTRAVENOUS; SUBCUTANEOUS at 20:12

## 2022-04-03 RX ADMIN — ALTEPLASE 81 MILLIGRAM(S): KIT at 04:57

## 2022-04-03 RX ADMIN — LAMOTRIGINE 250 MILLIGRAM(S): 25 TABLET, ORALLY DISINTEGRATING ORAL at 11:39

## 2022-04-03 RX ADMIN — LEVETIRACETAM 1250 MILLIGRAM(S): 250 TABLET, FILM COATED ORAL at 22:03

## 2022-04-03 RX ADMIN — SODIUM CHLORIDE 75 MILLILITER(S): 9 INJECTION INTRAMUSCULAR; INTRAVENOUS; SUBCUTANEOUS at 18:50

## 2022-04-03 RX ADMIN — LEVETIRACETAM 1250 MILLIGRAM(S): 250 TABLET, FILM COATED ORAL at 11:38

## 2022-04-03 RX ADMIN — ALTEPLASE 540 MILLIGRAM(S): KIT at 04:57

## 2022-04-03 RX ADMIN — ATORVASTATIN CALCIUM 80 MILLIGRAM(S): 80 TABLET, FILM COATED ORAL at 21:18

## 2022-04-03 NOTE — STROKE CODE NOTE - ASSESSMENT/PLAN
Pt is a 50 yo F with PMHx of seizures, HTN, HLD, who presents with left sided weakness. LKW 0200. Pt was awake when symptoms began. NIHSS 5. CT head without acute process. Discussed risks vs benefit of IV alteplase with patient including but not limited to ~6% risk of hemorrhage. Pt wanted to discuss with her sister who is an RN before consenting, ultimately expressed understanding and wanted to pursue therapy. [place] at [address] by ED physician at bedside. Admit to ICU, maintain BP <180/105, SCD for DVT ppx, neuro/vitals checks as per post tPA stroke orderset, STAT CT head for any neuro decline. Pt is a 50 yo F with PMHx of seizures, HTN, HLD, who presents with left sided weakness. LKW 0200. Pt was awake when symptoms began. NIHSS 5. CT head without acute process. Discussed risks vs benefit of IV alteplase with patient including but not limited to ~6% risk of hemorrhage. Pt wanted to discuss with her sister who is an RN before consenting, ultimately expressed understanding and wanted to pursue therapy. IV alteplase bolus administered at 0453 by ED physician at bedside. Admit to ICU, maintain BP <180/105, SCD for DVT ppx, neuro/vitals checks as per post tPA stroke orderset, STAT CT head for any neuro decline. Pt is a 52 yo F with PMHx of seizures, HTN, HLD, who presents with left sided weakness. LKW 0200. Pt was awake when symptoms began. NIHSS 5. CT head without acute process. Discussed risks vs benefit of IV alteplase with patient including but not limited to ~6% risk of hemorrhage. Pt wanted to discuss with her sister who is an RN before consenting, ultimately expressed understanding and wanted to pursue therapy. IV alteplase bolus administered at 0453 by ED physician at bedside. Admit to ICU, maintain BP <180/105, SCD for DVT ppx, neuro/vitals checks as per post tPA stroke orderset, STAT CT head for any neuro decline. CTA/CTP pending.

## 2022-04-03 NOTE — ED PROVIDER NOTE - CRITICAL CARE ATTENDING CONTRIBUTION TO CARE
I personally evaluated the patient. I reviewed the Resident´s or Physician Assistant´s note (as assigned above), and agree with the findings and plan except as documented in my note.  50-year-old female, history of seizure, presents to the ED with left-sided weakness x1.5 hours associated with a headache.  Exam shows alert patient in no distress, HEENT NCAT PERRL, neck supple, lungs clear, RR S1S2, abdomen soft NT +BS, no CCE, skin no rash, neuro A&OX3 GCS 15 left sided weakness.

## 2022-04-03 NOTE — PROGRESS NOTE ADULT - ASSESSMENT
51 Y F with pmh of carotid artery disease, h/o seizures presents to ED with left sided weakness. She states that at 2AM she started to have left sided numbness and weakness from her neck down to her leg. Had similar sxs a few months ago that resolved on own, but today they persisted, decided to come to ED. Last few days had been having URI sxs, was taking herbal teas. Came to ED, decision made to give tPA.    suspected acute stroke with L sided weakness resolved s/p TPA     - monitor in ICU   - check ct head at 24hr eric from tpa - if no bleed start aspirin   - Lipitor 80mg qhs   - neuro checks   - continue home meds

## 2022-04-03 NOTE — H&P ADULT - NSHPPHYSICALEXAM_GEN_ALL_CORE
PHYSICAL EXAM:  GENERAL: NAD, lying in bed comfortably  HEAD:  Atraumatic, Normocephalic  EYES: EOMI, PERRLA, conjunctiva and sclera clear  ENT: Moist mucous membranes  NECK: Supple, No JVD  CHEST/LUNG: Clear to auscultation bilaterally; No rales, rhonchi, wheezing, or rubs. Unlabored respirations  HEART: Regular rate and rhythm; No murmurs, rubs, or gallops  ABDOMEN: Bowel sounds present; Soft, Nontender, Nondistended. No hepatomegaly  EXTREMITIES:  2+ Peripheral Pulses, brisk capillary refill. No clubbing, cyanosis, or edema  NERVOUS SYSTEM:  Alert & Oriented X3, speech clear. No deficits   MSK: 4/5 LUE/LLE strength. Regular otherwise.   SKIN: No rashes or lesions

## 2022-04-03 NOTE — ED PROVIDER NOTE - OBJECTIVE STATEMENT
1 Y F with pmh of carotid artery disease, h/o seizures presents to ED with left sided weakness. She states that at 2AM while watching TV she started to have left sided numbness and weakness from her neck down to her leg. Had similar sxs a few months ago that resolved on own, but today they persisted, decided to come to ED. Last few days had been having URI sxs, was taking herbal teas. NIH in ER of 5.

## 2022-04-03 NOTE — PATIENT PROFILE ADULT - FALL HARM RISK - HARM RISK INTERVENTIONS

## 2022-04-03 NOTE — ED ADULT NURSE NOTE - NSIMPLEMENTINTERV_GEN_ALL_ED
Implemented All Universal Safety Interventions:  [place] to call system. Call bell, personal items and telephone within reach. Instruct patient to call for assistance. Room bathroom lighting operational. Non-slip footwear when patient is off stretcher. Physically safe environment: no spills, clutter or unnecessary equipment. Stretcher in lowest position, wheels locked, appropriate side rails in place.

## 2022-04-03 NOTE — ED ADULT NURSE NOTE - NSICDXPASTSURGICALHX_GEN_ALL_CORE_FT
PAST SURGICAL HISTORY:  Atypical polypoid adenomyoma of uterus     H/O breast biopsy left 1990's    H/O colonoscopy 1 year ago

## 2022-04-03 NOTE — PROGRESS NOTE ADULT - SUBJECTIVE AND OBJECTIVE BOX
Patient reports her L side is now back to normal, no deficits       T(F): 97.6 (04-03-22 @ 07:41), Max: 98.3 (04-03-22 @ 04:06)  HR: 60 (04-03-22 @ 07:41)  BP: 112/66 (04-03-22 @ 07:41)  RR: 16 (04-03-22 @ 07:41)  SpO2: 100% (04-03-22 @ 07:41) (100% - 100%)    PHYSICAL EXAM:  GENERAL: NAD  HEAD:  Atraumatic, Normocephalic  EYES: EOMI, PERRLA, conjunctiva and sclera clear  NERVOUS SYSTEM:  Alert & Oriented X3, no focal deficits   CHEST/LUNG: Clear to percussion bilaterally; No rales, rhonchi, wheezing, or rubs  HEART: Regular rate and rhythm; No murmurs, rubs, or gallops  ABDOMEN: Soft, Nontender, Nondistended; Bowel sounds present  EXTREMITIES:  2+ Peripheral Pulses, No clubbing, cyanosis, or edema    LABS  04-03    133<L>  |  99  |  19  ----------------------------<  92  4.0   |  23  |  1.2    Ca    9.4      03 Apr 2022 04:21    TPro  6.6  /  Alb  4.3  /  TBili  0.3  /  DBili  x   /  AST  13  /  ALT  10  /  AlkPhos  98  04-03                          12.1   7.85  )-----------( 281      ( 03 Apr 2022 04:21 )             37.3     PT/INR - ( 03 Apr 2022 04:21 )   PT: 11.80 sec;   INR: 1.03 ratio         PTT - ( 03 Apr 2022 04:21 )  PTT:32.6 sec    CARDIAC ENZYMES      Troponin T, Serum: <0.01 ng/mL (04-03-22 @ 04:21)    RADIOLOGY  < from: CT Angio Neck w/ IV Cont (04.03.22 @ 06:34) >  IMPRESSION:    CT PERFUSION: No regional perfusion abnormality.    CTA BRAIN: Patent intracranial circulation. No flow-limiting stenosis or   occlusion.    CTA NECK: Patent anterior and posterior circulations without significant   ICA stenosis as per NASCET criteria.    < end of copied text >  < from: CT Brain Stroke Protocol (04.03.22 @ 04:32) >    IMPRESSION:      No CT evidence for acute intracranial pathology.      < end of copied text >    MEDICATIONS  (STANDING):  atorvastatin 80 milliGRAM(s) Oral at bedtime  chlorhexidine 4% Liquid 1 Application(s) Topical <User Schedule>  lamoTRIgine 250 milliGRAM(s) Oral daily  levETIRAcetam 1250 milliGRAM(s) Oral two times a day    MEDICATIONS  (PRN):

## 2022-04-03 NOTE — ED ADULT NURSE NOTE - NSICDXPASTMEDICALHX_GEN_ALL_CORE_FT
PAST MEDICAL HISTORY:  Arthritis     At high risk for seizures     Back injury     Left knee injury tear    Right shoulder injury tear    Seizure

## 2022-04-03 NOTE — ED PROVIDER NOTE - NS ED ROS FT
Constitutional: (-) fever (-) chills (-) lightheadedness   Eyes/ENT: (-) blurry vision, (-) epistaxis (-) rhinorrhea (-) nasal congestion  Cardiovascular: (-) chest pain, (-) syncope (-) palpitations   Respiratory: (-) cough, (-) shortness of breath (-) pleurisy   Gastrointestinal: (-) vomiting, (-) diarrhea (-) abdominal pain (-) nausea (-) anorexia  Musculoskeletal: (-) neck pain, (-) back pain, (-) joint pain (-) joint swelling (-) painful ROM  Integumentary: (-) rash, (-) edema (-) lacerations (-) pruritis   Neurological: (-) headache, (-) altered mental status (-) LOC (-) dizziness (+) paresthesias (-) gait abnormalities (+)weakness

## 2022-04-03 NOTE — ED ADULT NURSE NOTE - NSFALLRSKUNASSIST_ED_ALL_ED
Do You Have A Family History Of Psoriasis?: no How Severe Is Your Psoriasis?: moderate Is This A New Presentation, Or A Follow-Up?: Follow Up Psoriasis no

## 2022-04-03 NOTE — H&P ADULT - HISTORY OF PRESENT ILLNESS
51 Y F with pmh of carotid artery disease, h/o seizures presents to ED with left sided weakness. She states that at 2AM she started to have left sided numbness and weakness from her neck down to her leg. Had similar sxs a few months ago that resolved on own, but today they persisted, decided to come to ED. Last few days had been having URI sxs, was taking herbal teas. Came to ED, decision made to give tPA. Currently having headache. Imaging negative so far. Denies any fevers, chills, nausea, vomiting, sob, chest pain, palpitations. NIH 5 on admit.

## 2022-04-03 NOTE — H&P ADULT - NSHPLABSRESULTS_GEN_ALL_CORE
12.1   7.85  )-----------( 281      ( 03 Apr 2022 04:21 )             37.3     04-03    133<L>  |  99  |  19  ----------------------------<  92  4.0   |  23  |  1.2    Ca    9.4      03 Apr 2022 04:21    TPro  6.6  /  Alb  4.3  /  TBili  0.3  /  DBili  x   /  AST  13  /  ALT  10  /  AlkPhos  98  04-03    < from: CT Brain Stroke Protocol (04.03.22 @ 04:32) >    IMPRESSION:    No CT evidence for acute intracranial pathology.    < end of copied text >

## 2022-04-03 NOTE — ED PROVIDER NOTE - PHYSICAL EXAMINATION
Physical Exam    Vital Signs: I have reviewed the initial vital signs.  Constitutional: well-nourished, appears stated age, no acute distress  Eyes: Conjunctiva pink, Sclera clear, PERRLA, EOMI, no ptosis, no entrapment, no racoon eyes.  Cardiovascular: S1 and S2, regular rate, regular rhythm, well-perfused extremities, radial pulses equal and 2+, calves nonttp, equal in size  Respiratory: unlabored respiratory effort, speaking in full sentences, handling oral secretions, clear to auscultation bilaterally no wheezing, rales and rhonchi  Gastrointestinal: soft, non-tender abdomen  Musculoskeletal: supple neck, no lower extremity edema, no midline tenderness, paraspinal tenderness, clavicular creptius, painful rom, moving all extreities appropriately, no gross bony deformities or swelling.  Integumentary: warm, dry, no rashes, lacerations,  Neurologic: awake, alert, cranial nerves II-XII grossly intact, extremities’ + dec L sided motor weakness + drift and difficulty against gravity in LUE and LLE , sensory functions grossly intact, no nystagmus, tremors, fasciculations facial droop,no dysmetria  Psychiatric: appropriate mood, appropriate affect

## 2022-04-03 NOTE — H&P ADULT - ATTENDING COMMENTS
Pt seen w resident and agree w above.  Pt w sudden onset of L arm numbness.  denies ha/dizziness or palpitations. Pt reports recent herbal remedies for URI. At this time there appears to be some persistent weakness of LUE. Pt was a candidate for tPA and it was administered in the ED.  At this time, Q1hr neuro checks.  no fingersticks or blood draws. Repeat ct head 8-12 hours. Neuro eval/f/u. Pt w hx of seizures controlled w keppra and lamictal. would resume oral meds. Obtain 2d echo. ICU monitoring.

## 2022-04-03 NOTE — H&P ADULT - ASSESSMENT
51 Y F with pmh of carotid artery disease, h/o seizures presents to ED with left sided weakness. Given tPA in ED c/f CVA.    IMPRESSION:  Left sided weakness s/p tPA  H/o seizures  H/o carotid artery disease     PLAN:    CNS: AAOx3. Avoid CNS depressants. F/u CTP/CTA. MR once stable (will get right shoulder xray first, had procedure). Check lamictal/keppra levels. Neuro consult.     HEENT: Oral care.    PULMONARY:  HOB @ 45 degrees. On room air.      CARDIOVASCULAR: ECHO with bubble. Keep SBP<180/105.      GI: PO feeding. Speech/swallow eval.      RENAL:  Follow up lytes. Correct as needed.     INFECTIOUS DISEASE: No abx.     HEMATOLOGICAL: SCDs. No blood draws for 24 hours.     ENDOCRINE: Follow up FS. Insulin protocol if needed.     MUSCULOSKELETAL: Strict bedrest.     DISPO: ICU monitoring.        51 Y F with pmh of carotid artery disease, h/o seizures presents to ED with left sided weakness. Given tPA in ED c/f CVA.    IMPRESSION:  Left sided weakness s/p tPA  H/o seizures  H/o carotid artery disease     PLAN:    CNS: AAOx3. Avoid CNS depressants. F/u CTP/CTA. MR once stable (will get right shoulder xray first, had procedure). Check lamictal/keppra levels. Neuro consult.     HEENT: Oral care.    PULMONARY:  HOB @ 45 degrees. On room air.      CARDIOVASCULAR: ECHO with bubble. Keep SBP<180/105.      GI: NPO pending speech/swallow eval.      RENAL:  Follow up lytes. Correct as needed.     INFECTIOUS DISEASE: No abx.     HEMATOLOGICAL: SCDs. No blood draws for 24 hours.     ENDOCRINE: Follow up FS. Insulin protocol if needed. Lipid panel.     MUSCULOSKELETAL: Strict bedrest.     DISPO: ICU monitoring.        51 Y F with pmh of carotid artery disease, h/o seizures presents to ED with left sided weakness. Given tPA in ED c/f CVA.    IMPRESSION:  Left sided weakness s/p tPA  H/o seizures  H/o carotid artery disease     PLAN:    CNS: AAOx3. Avoid CNS depressants. F/u CTP/CTA. MR once stable (will get right shoulder xray first, had procedure). Check lamictal/keppra levels. Neuro consult.     HEENT: Oral care.    PULMONARY:  HOB @ 45 degrees. On room air.      CARDIOVASCULAR: ECHO with bubble. Keep SBP<180/105.      GI: NPO pending speech/swallow eval.      RENAL:  Follow up lytes. Correct as needed.     INFECTIOUS DISEASE: No abx.     HEMATOLOGICAL: SCDs. No blood draws for 24 hours.     ENDOCRINE: Follow up FS. Insulin protocol if needed. Lipid panel. A1c.    MUSCULOSKELETAL: Strict bedrest.     DISPO: ICU monitoring.

## 2022-04-03 NOTE — ED PROVIDER NOTE - CLINICAL SUMMARY MEDICAL DECISION MAKING FREE TEXT BOX
Stroke code called.  Discussed with neurology.  Head CT negative.  No contraindications to thrombolysis.  TPA given.  Will admit to ICU.

## 2022-04-03 NOTE — CONSULT NOTE ADULT - SUBJECTIVE AND OBJECTIVE BOX
LIVE CHAVIS     51y     Female    [id number]                                                           CC:Patient is a 51y old  Female who presents with a chief complaint of left sided weakness (03 Apr 2022 08:50)      HPI:  51 Y F with pmh of carotid artery disease, h/o seizures presents to ED with left sided weakness. She states that at 2AM she started to have left sided numbness and weakness from her neck down to her leg. Had similar sxs a few months ago that resolved on own, but today they persisted, decided to come to ED. Last few days had been having URI sxs, was taking herbal teas. Came to ED, decision made to give tPA. Currently having headache. Imaging negative so far. Denies any fevers, chills, nausea, vomiting, sob, chest pain, palpitations. NIH 5 on admit.  (03 Apr 2022 05:40)      ROS:  Constitutional, Neurological, Psychiatric, Eyes, ENT, Cardiovascular, Respiratory, Gastrointestinal, Genitourinary, Musculoskeletal, Integumentary, Endocrine and Heme/Lymph are otherwise negative. Except for noted above    Social History: No smoking, No drinking, No drug use    FAMILY HISTORY:            Vital Signs Last 24 Hrs  T(C): 36.4 (03 Apr 2022 07:41), Max: 36.8 (03 Apr 2022 04:06)  T(F): 97.6 (03 Apr 2022 07:41), Max: 98.3 (03 Apr 2022 04:06)  HR: 60 (03 Apr 2022 07:41) (58 - 70)  BP: 112/66 (03 Apr 2022 07:41) (110/58 - 146/68)  BP(mean): --  RR: 16 (03 Apr 2022 07:41) (16 - 18)  SpO2: 100% (03 Apr 2022 07:41) (100% - 100%)    Physical Exam:  Constitutional: alert and in no acute distress.  Eyes: the sclera and conjunctiva were normal, pupils were equal in size, round, reactive to light, with normal accommodation and extraocular movements were intact.   Back: no costovertebral angle tenderness and no spinal tenderness.      Neuro Exam:  Orientation: oriented to person, oriented to place and oriented to time.   Attention: normal concentrating ability and visual attention was not decreased.   Language: no difficulty naming common objects, no difficulty repeating a phrase, no difficulty writing a sentence, fluency intact, comprehension intact and reading intact.   Fund of knowledge: displays adequate knowledge of personal past history.   Cranial Nerves: visual acuity intact bilaterally, visual fields full to confrontation, pupils equal round and reactive to light, extraocular motion intact, facial sensation intact symmetrically, face symmetrical, hearing was intact bilaterally, tongue and palate midline, head turning and shoulder shrug symmetric and there was no tongue deviation with protrusion.   Motor: muscle tone was normal in all four extremities, muscle strength was normal in all four extremities and normal bulk in all four extremities.   Sensory exam: light touch was intact.   Coordination:. normal gait. balance was intact. there was no past-pointing. no tremor present.   Deep tendon reflexes:   Biceps right 2+. Biceps left 2+.    Triceps right 2+. Triceps left 2+.    Brachioradialis right 2+. Brachioradialis left 2+.    Patella right 2+. Patella left 2+.    Ankle jerk right 2+. Ankle jerk left 2+.   Plantar responses normal on the right, normal on the left.      NIHSS:     Allergies    No Known Allergies    Intolerances       Home Medications:  aspirin 81 mg oral tablet: 1 tab(s) orally once a month (03 Apr 2022 05:40)  Keppra 1000 mg oral tablet: 1 tab(s) orally 2 times a day (03 Apr 2022 05:40)  Keppra 250 mg oral tablet: 1 tab(s) orally 2 times a day (03 Apr 2022 05:40)  LaMICtal: 250 milligram(s) orally once a day (03 Apr 2022 05:40)  Lipitor 40 mg oral tablet: 1 tab(s) orally once a day (03 Apr 2022 05:40)      MEDICATIONS  (STANDING):  atorvastatin 80 milliGRAM(s) Oral at bedtime  chlorhexidine 4% Liquid 1 Application(s) Topical <User Schedule>  lamoTRIgine 250 milliGRAM(s) Oral daily  levETIRAcetam 1250 milliGRAM(s) Oral two times a day    MEDICATIONS  (PRN):      LABS:                        12.1   7.85  )-----------( 281      ( 03 Apr 2022 04:21 )             37.3     04-03    133<L>  |  99  |  19  ----------------------------<  92  4.0   |  23  |  1.2    Ca    9.4      03 Apr 2022 04:21    TPro  6.6  /  Alb  4.3  /  TBili  0.3  /  DBili  x   /  AST  13  /  ALT  10  /  AlkPhos  98  04-03    PT/INR - ( 03 Apr 2022 04:21 )   PT: 11.80 sec;   INR: 1.03 ratio         PTT - ( 03 Apr 2022 04:21 )  PTT:32.6 sec          Neuro Imaging:  NCHCT:   < from: CT Angio Neck w/ IV Cont (04.03.22 @ 06:34) >  PROCEDURE DATE:  04/03/2022          INTERPRETATION:  HISTORY: Stroke code. Left sided weakness.    COMPARISON: Noncontrast CT head 4/3/2022    TECHNIQUE: CT angiogram of the neck and brain was performed after   administration of intravenous contrast. MIP and 3D reconstructions were   performed. Perfusion maps were also generated and submitted for   evaluation.    Total of 175 cc Omnipaque 350 intravenous contrast were administered   without complication.      FINDINGS:  CT PERFUSION    Perfusion parameters are reported as follows:    CBF <30%: 0 mL  TMax > 6s: 0 mL  Mismatch volume: 0 mL  Mismatch ratio: None    CTA BRAIN    INTERNAL CAROTID ARTERIES:  The intracranial segments of the ICA are   patent without hemodynamically significant stenosis, occlusion, or   aneurysm. The ICA bifurcations are unremarkable.    ANTERIOR CEREBRAL ARTERIES: No flow-limiting stenosis or occlusion.   Anterior communicating artery is unremarkable without aneurysm.    MIDDLE CEREBRAL ARTERIES: No flow-limiting stenosis or occlusion. MCA   bifurcationsare unremarkable without aneurysm.    POSTERIOR CIRCULATION.. Patent bilateral PCAs.    VERTEBROBASILAR SYSTEM: No flow-limiting stenosis or occlusion. Small   caliber basilar artery. Small caliber bilateral vertebral arteries..    CTA NECK    RIGHT CAROTID SYSTEM: Normal in course and caliber without flow-limiting   stenosis or occlusion.    LEFT CAROTID SYSTEM: Normal in course and caliber without flow-limiting   stenosis or occlusion.    VERTEBRAL SYSTEM: Diffuse small caliber of the bilateralvertebral   arteries. No flow-limiting stenosis or occlusion. Origin of the vertebral   arteries are unremarkable.    AORTIC ARCH: Origin of the great vessels are unremarkable.    Prominent adenoid soft tissues    IMPRESSION:    CT PERFUSION: No regional perfusion abnormality.    CTA BRAIN: Patent intracranial circulation. No flow-limiting stenosis or   occlusion.    CTA NECK: Patent anterior and posterior circulations without significant   ICA stenosis as per NASCET criteria.    --- End of Report ---    < end of copied text >      < from: CT Brain Stroke Protocol (04.03.22 @ 04:32) >      IMPRESSION:      No CT evidence for acute intracranial pathology.        Dr. Chase Chiu discussed preliminary findings with ERICA SANTA MD on 4/3/2022 4:32 AM with readback.    --- End of Report ---    < end of copied text >      EEG:    Echo:  Carotid Doppler:  Telemetry:    Assessment / Plan: This is a 51y year old Female presenting with   1.                  LIVE CHAVIS     51y     Female    MRN-[id number]                                                           CC:Patient is a 51y old  Female who presents with a chief complaint of left sided weakness (03 Apr 2022 08:50)      HPI:  51 Y F with pmh of carotid artery disease, h/o seizures presents to ED with left sided weakness. She states that at 2AM she started to have left sided numbness and weakness from her neck down to her leg. Had similar sxs a few months ago that resolved on own, but today they persisted, decided to come to ED. Last few days had been having URI sxs, was taking herbal teas. Came to ED, decision made to give tPA. Currently having headache. Imaging negative so far. Denies any fevers, chills, nausea, vomiting, sob, chest pain, palpitations. NIH 5 on admit.  (03 Apr 2022 05:40)    Patient seen and examined and initially c/o numbness in the left arm that spread to the leg and stopped at the left chin.  Was given TPA in ED and symptoms have nearly resolved.  She does not feel numbness or weakness anymore.  Does admit to neck pain but it is intermittent.      ROS:  Constitutional, Neurological, Psychiatric, Eyes, ENT, Cardiovascular, Respiratory, Gastrointestinal, Genitourinary, Musculoskeletal, Integumentary, Endocrine and Heme/Lymph are otherwise negative. Except for noted above    Social History: No smoking, No drinking, No drug use    FAMILY HISTORY: non-conytibutory            Vital Signs Last 24 Hrs  T(C): 36.4 (03 Apr 2022 07:41), Max: 36.8 (03 Apr 2022 04:06)  T(F): 97.6 (03 Apr 2022 07:41), Max: 98.3 (03 Apr 2022 04:06)  HR: 60 (03 Apr 2022 07:41) (58 - 70)  BP: 112/66 (03 Apr 2022 07:41) (110/58 - 146/68)  BP(mean): --  RR: 16 (03 Apr 2022 07:41) (16 - 18)  SpO2: 100% (03 Apr 2022 07:41) (100% - 100%)    Physical Exam:  Constitutional: alert and in no acute distress.  Eyes: the sclera and conjunctiva were normal, pupils were equal in size, round, reactive to light, with normal accommodation and extraocular movements were intact.   Back: no costovertebral angle tenderness and no spinal tenderness.      Neuro Exam:  Orientation: oriented to person, oriented to place and oriented to time.   Attention: normal concentrating ability and visual attention was not decreased.   Language: no difficulty naming common objects, no difficulty repeating a phrase, no difficulty writing a sentence, fluency intact, comprehension intact and reading intact.   Fund of knowledge: displays adequate knowledge of personal past history.   Cranial Nerves: visual fields full to confrontation, pupils equal round and reactive to light, extraocular motion intact, facial sensation intact symmetrically, face symmetrical, hearing was intact bilaterally, tongue and palate midline, head turning and shoulder shrug symmetric and there was no tongue deviation with protrusion.   Motor: muscle tone was normal in all four extremities, muscle strength was normal in all four extremities and normal bulk in all four extremities.   Sensory exam: light touch was intact.   Coordination: gait deferred. there was no past-pointing. no tremor present.   Deep tendon reflexes:   1+ in UE and 1+ patellars and 1+ ankles  Plantar responses normal on the right, normal on the left.      NIHSS: 0    Allergies    No Known Allergies    Intolerances       Home Medications:  aspirin 81 mg oral tablet: 1 tab(s) orally once a month (03 Apr 2022 05:40)  Keppra 1000 mg oral tablet: 1 tab(s) orally 2 times a day (03 Apr 2022 05:40)  Keppra 250 mg oral tablet: 1 tab(s) orally 2 times a day (03 Apr 2022 05:40)  LaMICtal: 250 milligram(s) orally once a day (03 Apr 2022 05:40)  Lipitor 40 mg oral tablet: 1 tab(s) orally once a day (03 Apr 2022 05:40)      MEDICATIONS  (STANDING):  atorvastatin 80 milliGRAM(s) Oral at bedtime  chlorhexidine 4% Liquid 1 Application(s) Topical <User Schedule>  lamoTRIgine 250 milliGRAM(s) Oral daily  levETIRAcetam 1250 milliGRAM(s) Oral two times a day    MEDICATIONS  (PRN):      LABS:                        12.1   7.85  )-----------( 281      ( 03 Apr 2022 04:21 )             37.3     04-03    133<L>  |  99  |  19  ----------------------------<  92  4.0   |  23  |  1.2    Ca    9.4      03 Apr 2022 04:21    TPro  6.6  /  Alb  4.3  /  TBili  0.3  /  DBili  x   /  AST  13  /  ALT  10  /  AlkPhos  98  04-03    PT/INR - ( 03 Apr 2022 04:21 )   PT: 11.80 sec;   INR: 1.03 ratio         PTT - ( 03 Apr 2022 04:21 )  PTT:32.6 sec          Neuro Imaging:  NCHCT:   < from: CT Angio Neck w/ IV Cont (04.03.22 @ 06:34) >  PROCEDURE DATE:  04/03/2022          INTERPRETATION:  HISTORY: Stroke code. Left sided weakness.    COMPARISON: Noncontrast CT head 4/3/2022    TECHNIQUE: CT angiogram of the neck and brain was performed after   administration of intravenous contrast. MIP and 3D reconstructions were   performed. Perfusion maps were also generated and submitted for   evaluation.    Total of 175 cc Omnipaque 350 intravenous contrast were administered   without complication.      FINDINGS:  CT PERFUSION    Perfusion parameters are reported as follows:    CBF <30%: 0 mL  TMax > 6s: 0 mL  Mismatch volume: 0 mL  Mismatch ratio: None    CTA BRAIN    INTERNAL CAROTID ARTERIES:  The intracranial segments of the ICA are   patent without hemodynamically significant stenosis, occlusion, or   aneurysm. The ICA bifurcations are unremarkable.    ANTERIOR CEREBRAL ARTERIES: No flow-limiting stenosis or occlusion.   Anterior communicating artery is unremarkable without aneurysm.    MIDDLE CEREBRAL ARTERIES: No flow-limiting stenosis or occlusion. MCA   bifurcationsare unremarkable without aneurysm.    POSTERIOR CIRCULATION.. Patent bilateral PCAs.    VERTEBROBASILAR SYSTEM: No flow-limiting stenosis or occlusion. Small   caliber basilar artery. Small caliber bilateral vertebral arteries..    CTA NECK    RIGHT CAROTID SYSTEM: Normal in course and caliber without flow-limiting   stenosis or occlusion.    LEFT CAROTID SYSTEM: Normal in course and caliber without flow-limiting   stenosis or occlusion.    VERTEBRAL SYSTEM: Diffuse small caliber of the bilateralvertebral   arteries. No flow-limiting stenosis or occlusion. Origin of the vertebral   arteries are unremarkable.    AORTIC ARCH: Origin of the great vessels are unremarkable.    Prominent adenoid soft tissues    IMPRESSION:    CT PERFUSION: No regional perfusion abnormality.    CTA BRAIN: Patent intracranial circulation. No flow-limiting stenosis or   occlusion.    CTA NECK: Patent anterior and posterior circulations without significant   ICA stenosis as per NASCET criteria.    --- End of Report ---    < end of copied text >      < from: CT Brain Stroke Protocol (04.03.22 @ 04:32) >      IMPRESSION:      No CT evidence for acute intracranial pathology.        Dr. Chase Chiu discussed preliminary findings with ERICA SANTA MD on 4/3/2022 4:32 AM with readback.    --- End of Report ---    < end of copied text >      EEG: none    Assessment / Plan: This is a 51y year old Female presenting with left sided numbness and weakness given TPA in the ED.  Symptoms have subsequently resolved.  DDX: Stroke, Seizure, Cervical spine lesion  1. Continue post TPA protocol  2. Repeat CTH in 24 hours  3. MRI brain and cspine w/o NOEMI  4. REEG  5. Had recent cardiac workup including stress test and ECHO (if can obtain records no need to repeat)  6. Will Follow

## 2022-04-03 NOTE — ED ADULT NURSE REASSESSMENT NOTE - NS ED NURSE REASSESS COMMENT FT1
report given to ICU RN Mirlande,  ED Hold RUBY Macias at bedside returned from break aware of report and reviewed NIH neuro check

## 2022-04-04 LAB
A1C WITH ESTIMATED AVERAGE GLUCOSE RESULT: 5.6 % — SIGNIFICANT CHANGE UP (ref 4–5.6)
ALBUMIN SERPL ELPH-MCNC: 3.6 G/DL — SIGNIFICANT CHANGE UP (ref 3.5–5.2)
ALP SERPL-CCNC: 85 U/L — SIGNIFICANT CHANGE UP (ref 30–115)
ALT FLD-CCNC: 8 U/L — SIGNIFICANT CHANGE UP (ref 0–41)
ANION GAP SERPL CALC-SCNC: 12 MMOL/L — SIGNIFICANT CHANGE UP (ref 7–14)
AST SERPL-CCNC: 12 U/L — SIGNIFICANT CHANGE UP (ref 0–41)
BASOPHILS # BLD AUTO: 0.02 K/UL — SIGNIFICANT CHANGE UP (ref 0–0.2)
BASOPHILS NFR BLD AUTO: 0.3 % — SIGNIFICANT CHANGE UP (ref 0–1)
BILIRUB SERPL-MCNC: 0.2 MG/DL — SIGNIFICANT CHANGE UP (ref 0.2–1.2)
BUN SERPL-MCNC: 15 MG/DL — SIGNIFICANT CHANGE UP (ref 10–20)
CALCIUM SERPL-MCNC: 8.6 MG/DL — SIGNIFICANT CHANGE UP (ref 8.5–10.1)
CHLORIDE SERPL-SCNC: 105 MMOL/L — SIGNIFICANT CHANGE UP (ref 98–110)
CO2 SERPL-SCNC: 21 MMOL/L — SIGNIFICANT CHANGE UP (ref 17–32)
CREAT SERPL-MCNC: 0.8 MG/DL — SIGNIFICANT CHANGE UP (ref 0.7–1.5)
EGFR: 88 ML/MIN/1.73M2 — SIGNIFICANT CHANGE UP
EOSINOPHIL # BLD AUTO: 0.12 K/UL — SIGNIFICANT CHANGE UP (ref 0–0.7)
EOSINOPHIL NFR BLD AUTO: 2 % — SIGNIFICANT CHANGE UP (ref 0–8)
ESTIMATED AVERAGE GLUCOSE: 114 MG/DL — SIGNIFICANT CHANGE UP (ref 68–114)
GLUCOSE SERPL-MCNC: 85 MG/DL — SIGNIFICANT CHANGE UP (ref 70–99)
HCG UR QL: NEGATIVE — SIGNIFICANT CHANGE UP
HCT VFR BLD CALC: 34.7 % — LOW (ref 37–47)
HGB BLD-MCNC: 11.4 G/DL — LOW (ref 12–16)
IMM GRANULOCYTES NFR BLD AUTO: 0.3 % — SIGNIFICANT CHANGE UP (ref 0.1–0.3)
LYMPHOCYTES # BLD AUTO: 2.43 K/UL — SIGNIFICANT CHANGE UP (ref 1.2–3.4)
LYMPHOCYTES # BLD AUTO: 40.4 % — SIGNIFICANT CHANGE UP (ref 20.5–51.1)
MAGNESIUM SERPL-MCNC: 1.9 MG/DL — SIGNIFICANT CHANGE UP (ref 1.8–2.4)
MCHC RBC-ENTMCNC: 28.9 PG — SIGNIFICANT CHANGE UP (ref 27–31)
MCHC RBC-ENTMCNC: 32.9 G/DL — SIGNIFICANT CHANGE UP (ref 32–37)
MCV RBC AUTO: 87.8 FL — SIGNIFICANT CHANGE UP (ref 81–99)
MONOCYTES # BLD AUTO: 0.48 K/UL — SIGNIFICANT CHANGE UP (ref 0.1–0.6)
MONOCYTES NFR BLD AUTO: 8 % — SIGNIFICANT CHANGE UP (ref 1.7–9.3)
NEUTROPHILS # BLD AUTO: 2.95 K/UL — SIGNIFICANT CHANGE UP (ref 1.4–6.5)
NEUTROPHILS NFR BLD AUTO: 49 % — SIGNIFICANT CHANGE UP (ref 42.2–75.2)
NRBC # BLD: 0 /100 WBCS — SIGNIFICANT CHANGE UP (ref 0–0)
PLATELET # BLD AUTO: 275 K/UL — SIGNIFICANT CHANGE UP (ref 130–400)
POTASSIUM SERPL-MCNC: 4.2 MMOL/L — SIGNIFICANT CHANGE UP (ref 3.5–5)
POTASSIUM SERPL-SCNC: 4.2 MMOL/L — SIGNIFICANT CHANGE UP (ref 3.5–5)
PROT SERPL-MCNC: 5.6 G/DL — LOW (ref 6–8)
RBC # BLD: 3.95 M/UL — LOW (ref 4.2–5.4)
RBC # FLD: 13.3 % — SIGNIFICANT CHANGE UP (ref 11.5–14.5)
SODIUM SERPL-SCNC: 138 MMOL/L — SIGNIFICANT CHANGE UP (ref 135–146)
TSH SERPL-MCNC: 3.65 UIU/ML — SIGNIFICANT CHANGE UP (ref 0.27–4.2)
WBC # BLD: 6.02 K/UL — SIGNIFICANT CHANGE UP (ref 4.8–10.8)
WBC # FLD AUTO: 6.02 K/UL — SIGNIFICANT CHANGE UP (ref 4.8–10.8)

## 2022-04-04 PROCEDURE — 93306 TTE W/DOPPLER COMPLETE: CPT | Mod: 26

## 2022-04-04 PROCEDURE — 70551 MRI BRAIN STEM W/O DYE: CPT | Mod: 26

## 2022-04-04 PROCEDURE — 70547 MR ANGIOGRAPHY NECK W/O DYE: CPT | Mod: 26

## 2022-04-04 PROCEDURE — 99233 SBSQ HOSP IP/OBS HIGH 50: CPT

## 2022-04-04 PROCEDURE — 99222 1ST HOSP IP/OBS MODERATE 55: CPT

## 2022-04-04 PROCEDURE — 70450 CT HEAD/BRAIN W/O DYE: CPT | Mod: 26

## 2022-04-04 RX ORDER — CLOPIDOGREL BISULFATE 75 MG/1
75 TABLET, FILM COATED ORAL DAILY
Refills: 0 | Status: DISCONTINUED | OUTPATIENT
Start: 2022-04-04 | End: 2022-04-05

## 2022-04-04 RX ORDER — SODIUM CHLORIDE 0.65 %
2 AEROSOL, SPRAY (ML) NASAL
Refills: 0 | Status: DISCONTINUED | OUTPATIENT
Start: 2022-04-04 | End: 2022-04-05

## 2022-04-04 RX ORDER — ASPIRIN/CALCIUM CARB/MAGNESIUM 324 MG
81 TABLET ORAL DAILY
Refills: 0 | Status: DISCONTINUED | OUTPATIENT
Start: 2022-04-04 | End: 2022-04-05

## 2022-04-04 RX ORDER — GUAIFENESIN/DEXTROMETHORPHAN 600MG-30MG
10 TABLET, EXTENDED RELEASE 12 HR ORAL EVERY 4 HOURS
Refills: 0 | Status: DISCONTINUED | OUTPATIENT
Start: 2022-04-04 | End: 2022-04-05

## 2022-04-04 RX ADMIN — ATORVASTATIN CALCIUM 80 MILLIGRAM(S): 80 TABLET, FILM COATED ORAL at 21:03

## 2022-04-04 RX ADMIN — Medication 81 MILLIGRAM(S): at 18:17

## 2022-04-04 RX ADMIN — Medication 10 MILLILITER(S): at 06:31

## 2022-04-04 RX ADMIN — LAMOTRIGINE 250 MILLIGRAM(S): 25 TABLET, ORALLY DISINTEGRATING ORAL at 11:24

## 2022-04-04 RX ADMIN — Medication 10 MILLILITER(S): at 21:04

## 2022-04-04 RX ADMIN — CLOPIDOGREL BISULFATE 75 MILLIGRAM(S): 75 TABLET, FILM COATED ORAL at 18:17

## 2022-04-04 RX ADMIN — SODIUM CHLORIDE 75 MILLILITER(S): 9 INJECTION INTRAMUSCULAR; INTRAVENOUS; SUBCUTANEOUS at 05:06

## 2022-04-04 RX ADMIN — CHLORHEXIDINE GLUCONATE 1 APPLICATION(S): 213 SOLUTION TOPICAL at 05:05

## 2022-04-04 RX ADMIN — LEVETIRACETAM 1250 MILLIGRAM(S): 250 TABLET, FILM COATED ORAL at 22:41

## 2022-04-04 RX ADMIN — Medication 2 SPRAY(S): at 03:56

## 2022-04-04 RX ADMIN — LEVETIRACETAM 1250 MILLIGRAM(S): 250 TABLET, FILM COATED ORAL at 11:12

## 2022-04-04 NOTE — PROGRESS NOTE ADULT - ASSESSMENT
A 50 y/o female presenting with left sided numbness and weakness given TPA in the ED.  Symptoms have subsequently resolved. Repeat CT head no acute changes.   DDX: Stroke, Seizure, Cervical spine lesion    Plan:     -since repeat CT negative, restart pt on antiplatelet -  ASA 81 mg daily and add plavix 75 daily for 21 days  -send hypercoagulable labs   -continue statin   - Continue post TPA protocol  - MRI brain and cspine w/o NOEMI  - f/u REEG, continue home AEDs  -neuro checks q 4     A 52 y/o female presenting with left sided numbness and weakness given TPA in the ED.  Symptoms have subsequently resolved. Repeat CT head no acute changes.   DDX: Stroke, Seizure, Cervical spine lesion    Plan:     -since repeat CT negative, restart pt on antiplatelet -  ASA 81 mg daily and add plavix 75 daily for 21 days  -send hypercoagulable labs   -continue statin   - Continue post TPA protocol  - MRI brain and cspine w/o NOEMI  - f/u REEG, continue home AEDs  -neuro checks q 4

## 2022-04-04 NOTE — PHYSICAL THERAPY INITIAL EVALUATION ADULT - ADDITIONAL COMMENTS
Per patient, she lives with son in private home with no steps outside but has 12 steps  with 2 rails inside; was not using any assistive device; going to outpatient PT after recent (R) shoulder surgery

## 2022-04-04 NOTE — OCCUPATIONAL THERAPY INITIAL EVALUATION ADULT - RANGE OF MOTION EXAMINATION
RUE AROM shoulder flexion 1/2 range, elbow/wrist/hand WFL; LUE AROM shoulder/elbow/wrist/hand WFL/deficits as listed below

## 2022-04-04 NOTE — PHYSICAL THERAPY INITIAL EVALUATION ADULT - PATIENT/FAMILY/SIGNIFICANT OTHER GOALS STATEMENT, PT EVAL
This is a 58-year-old male accompanied by his wife, Purvi.  Complains of a 6-month history approximately of diarrhea.  4 loose watery stools a day.  Has had a couple episodes where he has had bright red blood per rectum.  One time happened last week and the whole bowl was filled with blood.  He states he had a colonoscopy either when he was 48 or 53.  No polyps found.  No previous history of any family concerns with colon cancer.  At least his first-degree relatives.  Denies any weight loss and in fact has been gaining weight.  He does consume about 3 drinks a day.  Light beer.  States that his alcohol consumption has improved over time.  He denies any abdominal pain or rectal spasming but does have some rectal burning.   Patient would like to be independent like she was

## 2022-04-04 NOTE — CONSULT NOTE ADULT - ATTENDING COMMENTS
above note reviewed   MRI   follow neurology patient seen and examined agree above   MRI   follow neurology

## 2022-04-04 NOTE — PROGRESS NOTE ADULT - SUBJECTIVE AND OBJECTIVE BOX
Patient is a 51y old  Female who presents with a chief complaint of left sided weakness (04 Apr 2022 11:41)      T(F): 98 (04-04-22 @ 11:01), Max: 98.5 (04-03-22 @ 23:09)  HR: 60 (04-04-22 @ 17:36)  BP: 121/62 (04-04-22 @ 17:36)  RR: 13 (04-04-22 @ 17:36)  SpO2: 100% (04-04-22 @ 12:00) (97% - 100%)    PHYSICAL EXAM:  GENERAL: NAD  HEAD:  Atraumatic, Normocephalic  EYES: EOMI, PERRLA, conjunctiva and sclera clear  NERVOUS SYSTEM:  Alert & Oriented X3, no focal deficits   CHEST/LUNG: Clear to percussion bilaterally; No rales, rhonchi, wheezing, or rubs  HEART: Regular rate and rhythm; No murmurs, rubs, or gallops  ABDOMEN: Soft, Nontender, Nondistended; Bowel sounds present  EXTREMITIES:  2+ Peripheral Pulses, No clubbing, cyanosis, or edema    LABS  04-04    138  |  105  |  15  ----------------------------<  85  4.2   |  21  |  0.8    Ca    8.6      04 Apr 2022 05:56  Mg     1.9     04-04    TPro  5.6<L>  /  Alb  3.6  /  TBili  0.2  /  DBili  x   /  AST  12  /  ALT  8   /  AlkPhos  85  04-04                          11.4   6.02  )-----------( 275      ( 04 Apr 2022 05:56 )             34.7     PT/INR - ( 03 Apr 2022 04:21 )   PT: 11.80 sec;   INR: 1.03 ratio         PTT - ( 03 Apr 2022 04:21 )  PTT:32.6 sec    CARDIAC ENZYMES      Troponin T, Serum: <0.01 ng/mL (04-03-22 @ 04:21)    Diagnosis Line Sinus bradycardia  Otherwise normal ECG      RADIOLOGY  < from: MR Angio Neck No Cont (04.04.22 @ 15:11) >  IMPRESSION:    No evidence of major vascular stenosis or occlusion.    < end of copied text >  < from: MR Head No Cont (04.04.22 @ 15:11) >  IMPRESSION:    Unremarkable MRI of the brain. No acute infarct.    < end of copied text >  < from: CT Head No Cont (04.04.22 @ 08:39) >    IMPRESSION:  No acute intracranial pathology. No evidence of midline shift, mass   effect or intracranial hemorrhage.    < end of copied text >  < from: CT Perfusion w/ Maps w/ IV Cont (04.03.22 @ 06:34) >    IMPRESSION:    CT PERFUSION: No regional perfusion abnormality.    CTA BRAIN: Patent intracranial circulation. No flow-limiting stenosis or   occlusion.    CTA NECK: Patent anterior and posterior circulations without significant   ICA stenosis as per NASCET criteria.      < end of copied text >  < from: CT Brain Stroke Protocol (04.03.22 @ 04:32) >    IMPRESSION:      No CT evidence for acute intracranial pathology.        < end of copied text >    MEDICATIONS  (STANDING):  aspirin  chewable 81 milliGRAM(s) Oral daily  atorvastatin 80 milliGRAM(s) Oral at bedtime  chlorhexidine 4% Liquid 1 Application(s) Topical <User Schedule>  clopidogrel Tablet 75 milliGRAM(s) Oral daily  lamoTRIgine 250 milliGRAM(s) Oral daily  levETIRAcetam 1250 milliGRAM(s) Oral two times a day    MEDICATIONS  (PRN):  guaifenesin/dextromethorphan Oral Liquid 10 milliLiter(s) Oral every 4 hours PRN Cough  sodium chloride 0.65% Nasal 2 Spray(s) Both Nostrils every 2 hours PRN Nasal Congestion

## 2022-04-04 NOTE — OCCUPATIONAL THERAPY INITIAL EVALUATION ADULT - GENERAL OBSERVATIONS, REHAB EVAL
12:10-12:30; chart reviewed, ok to treat by Occupational Therapist as confirmed by RN Alejandra, Pt received semifowler +tele +BP cuff +pulse ox +BLE sequentials in NAD. Pt denied pain at rest and in agreement with OT IE.

## 2022-04-04 NOTE — OCCUPATIONAL THERAPY INITIAL EVALUATION ADULT - LIVES WITH, PROFILE
lives with son in private home, no steps to enter +12 steps with right handrail to bedroom bathroom +tub/children

## 2022-04-04 NOTE — PHYSICAL THERAPY INITIAL EVALUATION ADULT - GAIT DEVIATIONS NOTED, PT EVAL
stooped/guarded posture, dec heel strike/pushoff , dec arm swing/decreased mariely/decreased step length/decreased weight-shifting ability

## 2022-04-04 NOTE — CONSULT NOTE ADULT - ASSESSMENT
IMPRESSION:  CVA s/p TPA  HO Seizures  HO CAD    PLAN:    CNS: Avoid CNS depressants. Neuro checks q1h. Repeat CT Head in 24hrs. Follow institutional post IV TPA protocol. MRI brain w/wo julia. FU Neuro. REEG,    HEENT: Oral care    PULMONARY:  HOB @ 45 degrees.  Aspiration precautions. Target SpO2 92-96%, titrate oxygen as tolerated.    CARDIOVASCULAR: Avoid volume overload. ECHO with bubble study. Lipid panel.     GI: GI prophylaxis. Feeding as per S&S.  Bowel regimen. S&S eval.     RENAL: Follow up lytes. Correct as needed    INFECTIOUS DISEASE: No abx.     HEMATOLOGICAL:  DVT prophylaxis. No AC or antiplatelets for now.     ENDOCRINE:  Follow up FS. A1c, TSH. UDS, SDS.     MUSCULOSKELETAL: PT/ OT eval.     MICU monitoring  IMPRESSION:  CVA s/p TPA  HO Seizures  HO CAD    PLAN:    CNS: Avoid CNS depressants. follow neurology for Neuro checks . . Follow institutional post IV TPA protocol. MRI brain w/wo julia. FU Neuro. REEG,    HEENT: Oral care    PULMONARY:  HOB @ 45 degrees.  Aspiration precautions. Target SpO2 92-96%, titrate oxygen as tolerated.    CARDIOVASCULAR: Avoid volume overload. ECHO with bubble study. Lipid panel.     GI: GI prophylaxis. Feeding as per S&S.  Bowel regimen. S&S eval.     RENAL: Follow up lytes. Correct as needed    INFECTIOUS DISEASE: No abx.     HEMATOLOGICAL:  DVT prophylaxis. No AC or antiplatelets for now.     ENDOCRINE:  Follow up FS. A1c, TSH. UDS, SDS.     MUSCULOSKELETAL: PT/ OT eval.     MICU monitoring  follow neurology if can downgrade to tele Q 4 hrs   care as per cardiology

## 2022-04-04 NOTE — CONSULT NOTE ADULT - SUBJECTIVE AND OBJECTIVE BOX
Patient is a 51y old  Female who presents with a chief complaint of left sided weakness (04 Apr 2022 08:50)        Over Night Events:        ROS:     All pertinent ROS are negative except HPI         PHYSICAL EXAM    ICU Vital Signs Last 24 Hrs  T(C): 36.8 (04 Apr 2022 07:10), Max: 36.9 (03 Apr 2022 15:10)  T(F): 98.3 (04 Apr 2022 07:10), Max: 98.5 (03 Apr 2022 23:09)  HR: 62 (04 Apr 2022 07:00) (58 - 80)  BP: 101/55 (04 Apr 2022 07:00) (84/52 - 127/57)  BP(mean): 74 (04 Apr 2022 07:00) (63 - 85)  RR: 22 (04 Apr 2022 07:10) (15 - 31)  SpO2: 100% (04 Apr 2022 07:00) (97% - 100%)      CONSTITUTIONAL:   Ill appearing.  Well nourished.  NAD    ENT:   Airway patent,   Mouth with normal mucosa.   No thrush    EYES:   Pupils equal,   Round and reactive to light.    CARDIAC:   Normal rate,   Regular rhythm.    No edema      Vascular:  Normal systolic impulse  No Carotid bruits    RESPIRATORY:   No wheezing  Bilateral BS  Normal chest expansion  Not tachypneic,  No use of accessory muscles    GASTROINTESTINAL:  Abdomen soft,   Non-tender,   No guarding,   + BS    GENITOURINARY  normal genitalia for sex  no edema    MUSCULOSKELETAL:   Range of motion is not limited,  No clubbing, cyanosis    NEUROLOGICAL:   Alert and oriented   No motor  deficits.  pertinent DTRs normal    SKIN:   Skin normal color for race,   Warm and dry  No evidence of rash.    PSYCHIATRIC:   Normal mood and affect.   No apparent risk to self or others.    HEMATOLOGICAL:  No cervical  lymphadenopathy.  no inguinal lymphadenopathy      04-03-22 @ 07:01  -  04-04-22 @ 07:00  --------------------------------------------------------  IN:    sodium chloride 0.9%: 900 mL    Sodium Chloride 0.9% Bolus: 500 mL  Total IN: 1400 mL    OUT:    Voided (mL): 1250 mL  Total OUT: 1250 mL    Total NET: 150 mL          LABS:                            11.4   6.02  )-----------( 275      ( 04 Apr 2022 05:56 )             34.7                                               04-04    138  |  105  |  15  ----------------------------<  85  4.2   |  21  |  0.8    Ca    8.6      04 Apr 2022 05:56  Mg     1.9     04-04    TPro  5.6<L>  /  Alb  3.6  /  TBili  0.2  /  DBili  x   /  AST  12  /  ALT  8   /  AlkPhos  85  04-04      PT/INR - ( 03 Apr 2022 04:21 )   PT: 11.80 sec;   INR: 1.03 ratio         PTT - ( 03 Apr 2022 04:21 )  PTT:32.6 sec                                           CARDIAC MARKERS ( 03 Apr 2022 04:21 )  x     / <0.01 ng/mL / x     / x     / x                                                LIVER FUNCTIONS - ( 04 Apr 2022 05:56 )  Alb: 3.6 g/dL / Pro: 5.6 g/dL / ALK PHOS: 85 U/L / ALT: 8 U/L / AST: 12 U/L / GGT: x                                                                                                                                       MEDICATIONS  (STANDING):  atorvastatin 80 milliGRAM(s) Oral at bedtime  chlorhexidine 4% Liquid 1 Application(s) Topical <User Schedule>  lamoTRIgine 250 milliGRAM(s) Oral daily  levETIRAcetam 1250 milliGRAM(s) Oral two times a day  sodium chloride 0.9%. 1000 milliLiter(s) (75 mL/Hr) IV Continuous <Continuous>    MEDICATIONS  (PRN):  guaifenesin/dextromethorphan Oral Liquid 10 milliLiter(s) Oral every 4 hours PRN Cough  sodium chloride 0.65% Nasal 2 Spray(s) Both Nostrils every 2 hours PRN Nasal Congestion      New X-rays reviewed:                                                                                  ECHO    CXR interpreted by me:         Patient is a 51y old  Female who presents with a chief complaint of left sided weakness (04 Apr 2022 08:50)      HPI:  51 Y F with pmh of carotid artery disease, h/o seizures presents to ED with left sided weakness. She states that at 2AM she started to have left sided numbness and weakness from her neck down to her leg. Had similar sxs a few months ago that resolved on own, but today they persisted, decided to come to ED. Last few days had been having URI sxs, was taking herbal teas. Came to ED, decision made to give tPA. Currently having headache. Imaging negative so far. Denies any fevers, chills, nausea, vomiting, sob, chest pain, palpitations. NIH 5 on admit.  (03 Apr 2022 05:40)  feel better no deficit     PAST MEDICAL & SURGICAL HISTORY:  Seizure    At high risk for seizures    Arthritis    Back injury    Right shoulder injury  tear    Left knee injury  tear    Atypical polypoid adenomyoma of uterus    H/O breast biopsy  left 1990&#x27;s    H/O colonoscopy  1 year ago      Allergies    No Known Allergies    Intolerances      Family history : no cardiovscular family history   Home Medications:  aspirin 81 mg oral tablet: 1 tab(s) orally once a month (03 Apr 2022 05:40)  Keppra 1000 mg oral tablet: 1 tab(s) orally 2 times a day (03 Apr 2022 05:40)  Keppra 250 mg oral tablet: 1 tab(s) orally 2 times a day (03 Apr 2022 05:40)  LaMICtal: 250 milligram(s) orally once a day (03 Apr 2022 05:40)  Lipitor 40 mg oral tablet: 1 tab(s) orally once a day (03 Apr 2022 05:40)    Occupation:  Alochol: Denied  Smoking: Denied  Drug Use: Denied  Marital Status:         ROS: as in HPI; All other systems reviewed are negative    ICU Vital Signs Last 24 Hrs  T(C): 36.8 (04 Apr 2022 07:10), Max: 36.9 (03 Apr 2022 15:10)  T(F): 98.3 (04 Apr 2022 07:10), Max: 98.5 (03 Apr 2022 23:09)  HR: 64 (04 Apr 2022 09:00) (58 - 80)  BP: 117/66 (04 Apr 2022 09:00) (84/52 - 127/57)  BP(mean): 86 (04 Apr 2022 09:00) (63 - 86)  ABP: --  ABP(mean): --  RR: 21 (04 Apr 2022 09:00) (15 - 31)  SpO2: 100% (04 Apr 2022 09:00) (97% - 100%)        Physical Examination:    General: No acute distress.  Alert, oriented, interactive, nonfocal    HEENT: Pupils equal, reactive to light.  Symmetric.    PULM: Clear to auscultation bilaterally, no significant sputum production    CVS: Regular rate and rhythm, no murmurs, rubs, or gallops    ABD: Soft, nondistended, nontender, normoactive bowel sounds, no masses    EXT: No edema, nontender, no clubbing     SKIN: Warm and well perfused, no rashes noted.    Neurology : no motor or sensory deficit     Musculoskeletal : move all extremity     Lymphatic system: no Palpable node               I&O's Detail    03 Apr 2022 07:01  -  04 Apr 2022 07:00  --------------------------------------------------------  IN:    sodium chloride 0.9%: 975 mL    Sodium Chloride 0.9% Bolus: 500 mL  Total IN: 1475 mL    OUT:    Voided (mL): 1250 mL  Total OUT: 1250 mL    Total NET: 225 mL      04 Apr 2022 07:01  -  04 Apr 2022 09:22  --------------------------------------------------------  IN:    sodium chloride 0.9%: 150 mL  Total IN: 150 mL    OUT:  Total OUT: 0 mL    Total NET: 150 mL            LABS:                        11.4   6.02  )-----------( 275      ( 04 Apr 2022 05:56 )             34.7     04 Apr 2022 05:56    138    |  105    |  15     ----------------------------<  85     4.2     |  21     |  0.8      Ca    8.6        04 Apr 2022 05:56  Mg     1.9       04 Apr 2022 05:56    TPro  5.6    /  Alb  3.6    /  TBili  0.2    /  DBili  x      /  AST  12     /  ALT  8      /  AlkPhos  85     04 Apr 2022 05:56  Amylase x     lipase x          CARDIAC MARKERS ( 03 Apr 2022 04:21 )  x     / <0.01 ng/mL / x     / x     / x          CAPILLARY BLOOD GLUCOSE  89 (03 Apr 2022 04:55)      POCT Blood Glucose.: 89 mg/dL (03 Apr 2022 04:11)    PT/INR - ( 03 Apr 2022 04:21 )   PT: 11.80 sec;   INR: 1.03 ratio         PTT - ( 03 Apr 2022 04:21 )  PTT:32.6 sec    Culture        MEDICATIONS  (STANDING):  atorvastatin 80 milliGRAM(s) Oral at bedtime  chlorhexidine 4% Liquid 1 Application(s) Topical <User Schedule>  lamoTRIgine 250 milliGRAM(s) Oral daily  levETIRAcetam 1250 milliGRAM(s) Oral two times a day  sodium chloride 0.9%. 1000 milliLiter(s) (75 mL/Hr) IV Continuous <Continuous>    MEDICATIONS  (PRN):  guaifenesin/dextromethorphan Oral Liquid 10 milliLiter(s) Oral every 4 hours PRN Cough  sodium chloride 0.65% Nasal 2 Spray(s) Both Nostrils every 2 hours PRN Nasal Congestion        RADIOLOGY: ***   < from: CT Head No Cont (04.04.22 @ 08:39) >  No acute intracranial pathology. No evidence of midline shift, mass   effect or intracranial hemorrhage.    < end of copied text >    CXR:  TLC:  OG:  ET tube:        CAM ICU:  ECHO:

## 2022-04-04 NOTE — PROGRESS NOTE ADULT - SUBJECTIVE AND OBJECTIVE BOX
Neurology Follow up note    Name  LIVE CHAVIS    HPI:  51 Y F with pmh of carotid artery disease, h/o seizures presents to ED with left sided weakness. She states that at 2AM she started to have left sided numbness and weakness from her neck down to her leg. Had similar sxs a few months ago that resolved on own, but today they persisted, decided to come to ED. Last few days had been having URI sxs, was taking herbal teas. Came to ED, decision made to give tPA. Currently having headache. Imaging negative so far. Denies any fevers, chills, nausea, vomiting, sob, chest pain, palpitations. NIH 5 on admit.  (03 Apr 2022 05:40)      Neurology Interval History - Pt denies any symptoms today. Pt states is back to her normal, no left side numbness or weakness. Pt denies dizziness or blurry vision. Pt had repeat CTH today no changes, EEG -pending results . Pt follows up with neurologist Dr Pacheco for seizures, reports no seizure since 2013. PT states usually gets grand mal seizures , no numbness with seizures in the past. Pt reports suppose to take  2x asa a day ,but she takes every other day.       Vital Signs Last 24 Hrs  T(C): 36.7 (04 Apr 2022 11:01), Max: 36.9 (03 Apr 2022 15:10)  T(F): 98 (04 Apr 2022 11:01), Max: 98.5 (03 Apr 2022 23:09)  HR: 59 (04 Apr 2022 11:00) (58 - 80)  BP: 113/64 (04 Apr 2022 11:00) (84/52 - 127/57)  BP(mean): 82 (04 Apr 2022 11:00) (63 - 86)  RR: 23 (04 Apr 2022 11:01) (15 - 31)  SpO2: 99% (04 Apr 2022 11:00) (97% - 100%)      Neurological Exam:   Mental status: Awake, alert and oriented x3.  Recent and remote memory intact.  Naming, repetition and comprehension intact.  Attention/concentration intact.  No dysarthria, no aphasia.  Fund of knowledge appropriate.    Cranial nerves: pupils equally round and reactive to light, visual fields full, no nystagmus, extraocular muscles intact, face symmetric, tongue was midline.  Motor:  Normal bulk and tone, strength 5/5 in bilateral upper and lower extremities.   strength 5/5.  Rapid alternating movements intact and symmetric.   Sensation: Intact to light touch, proprioception, and pinprick.  No neglect.   Coordination: No dysmetria on finger-to-nose and heel-to-shin.  No clumsiness.  Reflexes: 2+ in upper and lower extremities, downgoing toes bilaterally      Medications  atorvastatin 80 milliGRAM(s) Oral at bedtime  chlorhexidine 4% Liquid 1 Application(s) Topical <User Schedule>  guaifenesin/dextromethorphan Oral Liquid 10 milliLiter(s) Oral every 4 hours PRN  lamoTRIgine 250 milliGRAM(s) Oral daily  levETIRAcetam 1250 milliGRAM(s) Oral two times a day  sodium chloride 0.65% Nasal 2 Spray(s) Both Nostrils every 2 hours PRN      Lab                        11.4   6.02  )-----------( 275      ( 04 Apr 2022 05:56 )             34.7     04-04    138  |  105  |  15  ----------------------------<  85  4.2   |  21  |  0.8    Ca    8.6      04 Apr 2022 05:56  Mg     1.9     04-04    TPro  5.6<L>  /  Alb  3.6  /  TBili  0.2  /  DBili  x   /  AST  12  /  ALT  8   /  AlkPhos  85  04-04    CAPILLARY BLOOD GLUCOSE        LIVER FUNCTIONS - ( 04 Apr 2022 05:56 )  Alb: 3.6 g/dL / Pro: 5.6 g/dL / ALK PHOS: 85 U/L / ALT: 8 U/L / AST: 12 U/L / GGT: x           PT/INR - ( 03 Apr 2022 04:21 )   PT: 11.80 sec;   INR: 1.03 ratio         PTT - ( 03 Apr 2022 04:21 )  PTT:32.6 sec    Radiology  < from: CT Head No Cont (04.04.22 @ 08:39) >    IMPRESSION:  No acute intracranial pathology. No evidence of midline shift, mass   effect or intracranial hemorrhage.    --- End of Report ---    MARIN GOMEZ MD; Attending Radiologist  This document has been electronically signed. Apr 4 2022  8:51AM    < end of copied text >       Neurology Follow up note    Name  LIVE CHAVIS    HPI:  51 Y F with pmh of carotid artery disease, h/o seizures presents to ED with left sided weakness. She states that at 2AM she started to have left sided numbness and weakness from her neck down to her leg. Had similar sxs a few months ago that resolved on own, but today they persisted, decided to come to ED. Last few days had been having URI sxs, was taking herbal teas. Came to ED, decision made to give tPA. Currently having headache. Imaging negative so far. Denies any fevers, chills, nausea, vomiting, sob, chest pain, palpitations. NIH 5 on admit.  (03 Apr 2022 05:40)    Neurology Interval History - Pt denies any symptoms today. Pt states is back to her normal, no left side numbness or weakness. Pt denies dizziness or blurry vision. Pt had repeat CTH today no changes, EEG -pending results . Pt follows up with neurologist Dr Pacheco for seizures, reports no seizure since 2013. PT states usually gets grand mal seizures , no numbness with seizures in the past. Pt reports suppose to take  2x asa a day ,but she takes every other day.       Vital Signs Last 24 Hrs  T(C): 36.7 (04 Apr 2022 11:01), Max: 36.9 (03 Apr 2022 15:10)  T(F): 98 (04 Apr 2022 11:01), Max: 98.5 (03 Apr 2022 23:09)  HR: 59 (04 Apr 2022 11:00) (58 - 80)  BP: 113/64 (04 Apr 2022 11:00) (84/52 - 127/57)  BP(mean): 82 (04 Apr 2022 11:00) (63 - 86)  RR: 23 (04 Apr 2022 11:01) (15 - 31)  SpO2: 99% (04 Apr 2022 11:00) (97% - 100%)      Neurological Exam:   Mental status: Awake, alert and oriented x3.  Recent and remote memory intact.  Naming, repetition and comprehension intact.  Attention/concentration intact.  No dysarthria, no aphasia.  Fund of knowledge appropriate.    Cranial nerves: pupils equally round and reactive to light, visual fields full, no nystagmus, extraocular muscles intact, face symmetric, tongue was midline.  Motor:  Normal bulk and tone, strength 5/5 in bilateral upper and lower extremities.   strength 5/5.  Rapid alternating movements intact and symmetric.   Sensation: Intact to light touch, proprioception, and pinprick.  No neglect.   Coordination: No dysmetria on finger-to-nose and heel-to-shin.  No clumsiness.  Reflexes: 2+ in upper and lower extremities, downgoing toes bilaterally      Medications  atorvastatin 80 milliGRAM(s) Oral at bedtime  chlorhexidine 4% Liquid 1 Application(s) Topical <User Schedule>  guaifenesin/dextromethorphan Oral Liquid 10 milliLiter(s) Oral every 4 hours PRN  lamoTRIgine 250 milliGRAM(s) Oral daily  levETIRAcetam 1250 milliGRAM(s) Oral two times a day  sodium chloride 0.65% Nasal 2 Spray(s) Both Nostrils every 2 hours PRN      Lab                        11.4   6.02  )-----------( 275      ( 04 Apr 2022 05:56 )             34.7     04-04    138  |  105  |  15  ----------------------------<  85  4.2   |  21  |  0.8    Ca    8.6      04 Apr 2022 05:56  Mg     1.9     04-04    TPro  5.6<L>  /  Alb  3.6  /  TBili  0.2  /  DBili  x   /  AST  12  /  ALT  8   /  AlkPhos  85  04-04    CAPILLARY BLOOD GLUCOSE        LIVER FUNCTIONS - ( 04 Apr 2022 05:56 )  Alb: 3.6 g/dL / Pro: 5.6 g/dL / ALK PHOS: 85 U/L / ALT: 8 U/L / AST: 12 U/L / GGT: x           PT/INR - ( 03 Apr 2022 04:21 )   PT: 11.80 sec;   INR: 1.03 ratio         PTT - ( 03 Apr 2022 04:21 )  PTT:32.6 sec    Radiology  < from: CT Head No Cont (04.04.22 @ 08:39) >    IMPRESSION:  No acute intracranial pathology. No evidence of midline shift, mass   effect or intracranial hemorrhage.    --- End of Report ---    MARIN GOMEZ MD; Attending Radiologist  This document has been electronically signed. Apr 4 2022  8:51AM    < end of copied text >

## 2022-04-04 NOTE — PROGRESS NOTE ADULT - ASSESSMENT
51 Y F with pmh of carotid artery disease, h/o seizures presents to ED with left sided weakness. She states that at 2AM she started to have left sided numbness and weakness from her neck down to her leg. Had similar sxs a few months ago that resolved on own, but today they persisted, decided to come to ED. Last few days had been having URI sxs, was taking herbal teas. Came to ED, decision made to give tPA.    suspected acute stroke with L sided weakness resolved s/p TPA     - Plavix and  aspirin   - Lipitor 80mg qhs   - neuro checks   - check EEG   - continue home meds

## 2022-04-04 NOTE — PHYSICAL THERAPY INITIAL EVALUATION ADULT - GENERAL OBSERVATIONS, REHAB EVAL
10:45-11:10 Chart reviewed. Pt encountered semireclined in bed, may be seen by Physical Therapist as confirmed with Nurse. Patient denied pain at rest and ready to try to walk,; +tele;mars

## 2022-04-04 NOTE — PROGRESS NOTE ADULT - NS ATTEND AMEND GEN_ALL_CORE FT
50 yo F w/ h/o epilepsy followed by Dr. Pacheco non-compliant with ASA currently with L hemiparesis/hemisensory loss resolved post thrombolysis with IV TPA currently back to baseline.  Recommendations as above.

## 2022-04-05 ENCOUNTER — TRANSCRIPTION ENCOUNTER (OUTPATIENT)
Age: 52
End: 2022-04-05

## 2022-04-05 VITALS — SYSTOLIC BLOOD PRESSURE: 108 MMHG | RESPIRATION RATE: 16 BRPM | HEART RATE: 60 BPM | DIASTOLIC BLOOD PRESSURE: 59 MMHG

## 2022-04-05 LAB
ALBUMIN SERPL ELPH-MCNC: 3.8 G/DL — SIGNIFICANT CHANGE UP (ref 3.5–5.2)
ALP SERPL-CCNC: 85 U/L — SIGNIFICANT CHANGE UP (ref 30–115)
ALT FLD-CCNC: 9 U/L — SIGNIFICANT CHANGE UP (ref 0–41)
ANION GAP SERPL CALC-SCNC: 10 MMOL/L — SIGNIFICANT CHANGE UP (ref 7–14)
AST SERPL-CCNC: 13 U/L — SIGNIFICANT CHANGE UP (ref 0–41)
BASOPHILS # BLD AUTO: 0.03 K/UL — SIGNIFICANT CHANGE UP (ref 0–0.2)
BASOPHILS NFR BLD AUTO: 0.5 % — SIGNIFICANT CHANGE UP (ref 0–1)
BILIRUB SERPL-MCNC: 0.3 MG/DL — SIGNIFICANT CHANGE UP (ref 0.2–1.2)
BUN SERPL-MCNC: 15 MG/DL — SIGNIFICANT CHANGE UP (ref 10–20)
CALCIUM SERPL-MCNC: 9 MG/DL — SIGNIFICANT CHANGE UP (ref 8.5–10.1)
CHLORIDE SERPL-SCNC: 101 MMOL/L — SIGNIFICANT CHANGE UP (ref 98–110)
CO2 SERPL-SCNC: 23 MMOL/L — SIGNIFICANT CHANGE UP (ref 17–32)
CREAT SERPL-MCNC: 0.8 MG/DL — SIGNIFICANT CHANGE UP (ref 0.7–1.5)
EGFR: 88 ML/MIN/1.73M2 — SIGNIFICANT CHANGE UP
EOSINOPHIL # BLD AUTO: 0.13 K/UL — SIGNIFICANT CHANGE UP (ref 0–0.7)
EOSINOPHIL NFR BLD AUTO: 2 % — SIGNIFICANT CHANGE UP (ref 0–8)
GLUCOSE SERPL-MCNC: 84 MG/DL — SIGNIFICANT CHANGE UP (ref 70–99)
HCT VFR BLD CALC: 35.7 % — LOW (ref 37–47)
HGB BLD-MCNC: 11.6 G/DL — LOW (ref 12–16)
IMM GRANULOCYTES NFR BLD AUTO: 0.2 % — SIGNIFICANT CHANGE UP (ref 0.1–0.3)
LYMPHOCYTES # BLD AUTO: 2.7 K/UL — SIGNIFICANT CHANGE UP (ref 1.2–3.4)
LYMPHOCYTES # BLD AUTO: 42.3 % — SIGNIFICANT CHANGE UP (ref 20.5–51.1)
MAGNESIUM SERPL-MCNC: 1.9 MG/DL — SIGNIFICANT CHANGE UP (ref 1.8–2.4)
MCHC RBC-ENTMCNC: 28.4 PG — SIGNIFICANT CHANGE UP (ref 27–31)
MCHC RBC-ENTMCNC: 32.5 G/DL — SIGNIFICANT CHANGE UP (ref 32–37)
MCV RBC AUTO: 87.5 FL — SIGNIFICANT CHANGE UP (ref 81–99)
MONOCYTES # BLD AUTO: 0.48 K/UL — SIGNIFICANT CHANGE UP (ref 0.1–0.6)
MONOCYTES NFR BLD AUTO: 7.5 % — SIGNIFICANT CHANGE UP (ref 1.7–9.3)
NEUTROPHILS # BLD AUTO: 3.04 K/UL — SIGNIFICANT CHANGE UP (ref 1.4–6.5)
NEUTROPHILS NFR BLD AUTO: 47.5 % — SIGNIFICANT CHANGE UP (ref 42.2–75.2)
NRBC # BLD: 0 /100 WBCS — SIGNIFICANT CHANGE UP (ref 0–0)
PLATELET # BLD AUTO: 297 K/UL — SIGNIFICANT CHANGE UP (ref 130–400)
POTASSIUM SERPL-MCNC: 4.1 MMOL/L — SIGNIFICANT CHANGE UP (ref 3.5–5)
POTASSIUM SERPL-SCNC: 4.1 MMOL/L — SIGNIFICANT CHANGE UP (ref 3.5–5)
PROT SERPL-MCNC: 5.9 G/DL — LOW (ref 6–8)
RAPID RVP RESULT: SIGNIFICANT CHANGE UP
RBC # BLD: 4.08 M/UL — LOW (ref 4.2–5.4)
RBC # FLD: 13.4 % — SIGNIFICANT CHANGE UP (ref 11.5–14.5)
SARS-COV-2 RNA SPEC QL NAA+PROBE: SIGNIFICANT CHANGE UP
SODIUM SERPL-SCNC: 134 MMOL/L — LOW (ref 135–146)
WBC # BLD: 6.39 K/UL — SIGNIFICANT CHANGE UP (ref 4.8–10.8)
WBC # FLD AUTO: 6.39 K/UL — SIGNIFICANT CHANGE UP (ref 4.8–10.8)

## 2022-04-05 PROCEDURE — 95816 EEG AWAKE AND DROWSY: CPT | Mod: 26

## 2022-04-05 PROCEDURE — 71045 X-RAY EXAM CHEST 1 VIEW: CPT | Mod: 26

## 2022-04-05 PROCEDURE — 99232 SBSQ HOSP IP/OBS MODERATE 35: CPT

## 2022-04-05 PROCEDURE — 99239 HOSP IP/OBS DSCHRG MGMT >30: CPT

## 2022-04-05 RX ORDER — ASPIRIN/CALCIUM CARB/MAGNESIUM 324 MG
1 TABLET ORAL
Qty: 0 | Refills: 0 | DISCHARGE

## 2022-04-05 RX ORDER — ASPIRIN/CALCIUM CARB/MAGNESIUM 324 MG
1 TABLET ORAL
Qty: 30 | Refills: 1
Start: 2022-04-05

## 2022-04-05 RX ORDER — ACETAMINOPHEN 500 MG
650 TABLET ORAL ONCE
Refills: 0 | Status: COMPLETED | OUTPATIENT
Start: 2022-04-05 | End: 2022-04-05

## 2022-04-05 RX ORDER — CLOPIDOGREL BISULFATE 75 MG/1
1 TABLET, FILM COATED ORAL
Qty: 21 | Refills: 0
Start: 2022-04-05 | End: 2022-04-25

## 2022-04-05 RX ORDER — ATORVASTATIN CALCIUM 80 MG/1
1 TABLET, FILM COATED ORAL
Qty: 0 | Refills: 0 | DISCHARGE

## 2022-04-05 RX ORDER — ENOXAPARIN SODIUM 100 MG/ML
40 INJECTION SUBCUTANEOUS EVERY 24 HOURS
Refills: 0 | Status: DISCONTINUED | OUTPATIENT
Start: 2022-04-05 | End: 2022-04-05

## 2022-04-05 RX ORDER — ASPIRIN/CALCIUM CARB/MAGNESIUM 324 MG
1 TABLET ORAL
Qty: 30 | Refills: 0
Start: 2022-04-05

## 2022-04-05 RX ORDER — ASPIRIN/CALCIUM CARB/MAGNESIUM 324 MG
1 TABLET ORAL
Qty: 30 | Refills: 1
Start: 2022-04-05 | End: 2022-06-03

## 2022-04-05 RX ORDER — ATORVASTATIN CALCIUM 80 MG/1
1 TABLET, FILM COATED ORAL
Qty: 30 | Refills: 0
Start: 2022-04-05 | End: 2022-05-04

## 2022-04-05 RX ADMIN — Medication 10 MILLILITER(S): at 02:21

## 2022-04-05 RX ADMIN — LAMOTRIGINE 250 MILLIGRAM(S): 25 TABLET, ORALLY DISINTEGRATING ORAL at 11:14

## 2022-04-05 RX ADMIN — Medication 81 MILLIGRAM(S): at 11:14

## 2022-04-05 RX ADMIN — Medication 650 MILLIGRAM(S): at 02:50

## 2022-04-05 RX ADMIN — CLOPIDOGREL BISULFATE 75 MILLIGRAM(S): 75 TABLET, FILM COATED ORAL at 11:14

## 2022-04-05 RX ADMIN — LEVETIRACETAM 1250 MILLIGRAM(S): 250 TABLET, FILM COATED ORAL at 11:15

## 2022-04-05 NOTE — PROGRESS NOTE ADULT - SUBJECTIVE AND OBJECTIVE BOX
Neurology Progress Note    Interval History:      HPI:  51 Y F with pmh of carotid artery disease, h/o seizures presents to ED with left sided weakness. She states that at 2AM she started to have left sided numbness and weakness from her neck down to her leg. Had similar sxs a few months ago that resolved on own, but today they persisted, decided to come to ED. Last few days had been having URI sxs, was taking herbal teas. Came to ED, decision made to give tPA. Currently having headache. Imaging negative so far. Denies any fevers, chills, nausea, vomiting, sob, chest pain, palpitations. NIH 5 on admit.  (03 Apr 2022 05:40)      PAST MEDICAL & SURGICAL HISTORY:  Seizure    At high risk for seizures    Arthritis    Back injury    Right shoulder injury  tear    Left knee injury  tear    Atypical polypoid adenomyoma of uterus    H/O breast biopsy  left 1990&#x27;s    H/O colonoscopy  1 year ago            Medications:  aspirin  chewable 81 milliGRAM(s) Oral daily  atorvastatin 80 milliGRAM(s) Oral at bedtime  chlorhexidine 4% Liquid 1 Application(s) Topical <User Schedule>  clopidogrel Tablet 75 milliGRAM(s) Oral daily  guaifenesin/dextromethorphan Oral Liquid 10 milliLiter(s) Oral every 4 hours PRN  lamoTRIgine 250 milliGRAM(s) Oral daily  levETIRAcetam 1250 milliGRAM(s) Oral two times a day  sodium chloride 0.65% Nasal 2 Spray(s) Both Nostrils every 2 hours PRN      Vital Signs Last 24 Hrs  T(C): 35.9 (05 Apr 2022 05:54), Max: 37.4 (04 Apr 2022 22:57)  T(F): 96.6 (05 Apr 2022 05:54), Max: 99.3 (04 Apr 2022 22:57)  HR: 61 (05 Apr 2022 05:54) (59 - 69)  BP: 108/55 (05 Apr 2022 05:54) (104/62 - 122/55)  BP(mean): 78 (04 Apr 2022 19:08) (78 - 86)  RR: 16 (05 Apr 2022 05:54) (13 - 33)  SpO2: 100% (05 Apr 2022 09:47) (99% - 100%)    Neurological Exam:   Mental status: Awake, alert and oriented x3.  Recent and remote memory intact.  Naming, repetition and comprehension intact.  Attention/concentration intact.  No dysarthria, no aphasia.  Fund of knowledge appropriate.    Cranial nerves: Pupils equally round and reactive to light, visual fields full, no nystagmus, extraocular muscles intact, V1 through V3 intact bilaterally and symmetric, face symmetric, hearing intact to finger rub, palate elevation symmetric, tongue was midline.  Motor:  MRC grading 5/5 b/l UE/LE.   strength 5/5.  Normal tone and bulk.  No abnormal movements.    Sensation: Intact to light touch, proprioception, and pinprick.   Coordination: No dysmetria on finger-to-nose and heel-to-shin.  No dysdiadokinesia.  Reflexes: 2+ in bilateral UE/LE, downgoing toes bilaterally. (-) Moser.  Gait: Narrow and steady. No ataxia.  Romberg negative    Labs:  CBC Full  -  ( 05 Apr 2022 05:40 )  WBC Count : 6.39 K/uL  RBC Count : 4.08 M/uL  Hemoglobin : 11.6 g/dL  Hematocrit : 35.7 %  Platelet Count - Automated : 297 K/uL  Mean Cell Volume : 87.5 fL  Mean Cell Hemoglobin : 28.4 pg  Mean Cell Hemoglobin Concentration : 32.5 g/dL  Auto Neutrophil # : 3.04 K/uL  Auto Lymphocyte # : 2.70 K/uL  Auto Monocyte # : 0.48 K/uL  Auto Eosinophil # : 0.13 K/uL  Auto Basophil # : 0.03 K/uL  Auto Neutrophil % : 47.5 %  Auto Lymphocyte % : 42.3 %  Auto Monocyte % : 7.5 %  Auto Eosinophil % : 2.0 %  Auto Basophil % : 0.5 %    04-05    134<L>  |  101  |  15  ----------------------------<  84  4.1   |  23  |  0.8    Ca    9.0      05 Apr 2022 05:40  Mg     1.9     04-05    TPro  5.9<L>  /  Alb  3.8  /  TBili  0.3  /  DBili  x   /  AST  13  /  ALT  9   /  AlkPhos  85  04-05    LIVER FUNCTIONS - ( 05 Apr 2022 05:40 )  Alb: 3.8 g/dL / Pro: 5.9 g/dL / ALK PHOS: 85 U/L / ALT: 9 U/L / AST: 13 U/L / GGT: x           < from: EEG (04.04.22 @ 09:20) >  Impression:  Normal  Abnormal due to the presence of: generalized slowing as above    Clinical Correlation & Recommendations  A normal EEG does not exclude the possibility of seizure disorder.   Clinical correlation is recommended.      Reviewed by:  Herberth Aguirre    Signed by:  Herberth Aguirre    --- End of Report ---    HERBERTH AGUIRRE MD  This document has been electronically signed. Apr 5 2022  9:27AM    < end of copied text >    < from: MR Head No Cont (04.04.22 @ 15:11) >  IMPRESSION:    Unremarkable MRI of the brain. No acute infarct.    --- End of Report ---    AMNA NOLAND MD; Attending Radiologist  This document has been electronically signed. Apr 4 2022  3:40PM    < end of copied text >    < from: MR Angio Neck No Cont (04.04.22 @ 15:11) >  IMPRESSION:    No evidence of major vascular stenosis or occlusion.    --- End of Report ---    AMNA NOLAND MD; Attending Radiologist  This document has been electronically signed. Apr 4 2022  3:39PM    < end of copied text >    < from: CT Angio Neck w/ IV Cont (04.03.22 @ 06:34) >  IMPRESSION:    CT PERFUSION: No regional perfusion abnormality.    CTA BRAIN: Patent intracranial circulation. No flow-limiting stenosis or   occlusion.    CTA NECK: Patent anterior and posterior circulations without significant   ICA stenosis as per NASCET criteria.    --- End of Report ---      STEPH ESTEBAN MD; Resident Radiologist  This document has been electronically signed.  LEDY DAVIES MD; Attending Radiologist  This document has been electronically signed. Apr  3 2022  7:22AM    < end of copied text >   Neurology Progress Note    Interval History:  No new neurologic issues.  Currently back to baseline.  Had post-nasal drip with cough overnight getting medical evaluation.      HPI:  51 Y F with pmh of carotid artery disease, h/o seizures presents to ED with left sided weakness. She states that at 2AM she started to have left sided numbness and weakness from her neck down to her leg. Had similar sxs a few months ago that resolved on own, but today they persisted, decided to come to ED. Last few days had been having URI sxs, was taking herbal teas. Came to ED, decision made to give tPA. Currently having headache. Imaging negative so far. Denies any fevers, chills, nausea, vomiting, sob, chest pain, palpitations. NIH 5 on admit.  (03 Apr 2022 05:40)      PAST MEDICAL & SURGICAL HISTORY:  Seizure    At high risk for seizures    Arthritis    Back injury    Right shoulder injury  tear    Left knee injury  tear    Atypical polypoid adenomyoma of uterus    H/O breast biopsy  left 1990&#x27;s    H/O colonoscopy  1 year ago            Medications:  aspirin  chewable 81 milliGRAM(s) Oral daily  atorvastatin 80 milliGRAM(s) Oral at bedtime  chlorhexidine 4% Liquid 1 Application(s) Topical <User Schedule>  clopidogrel Tablet 75 milliGRAM(s) Oral daily  guaifenesin/dextromethorphan Oral Liquid 10 milliLiter(s) Oral every 4 hours PRN  lamoTRIgine 250 milliGRAM(s) Oral daily  levETIRAcetam 1250 milliGRAM(s) Oral two times a day  sodium chloride 0.65% Nasal 2 Spray(s) Both Nostrils every 2 hours PRN      Vital Signs Last 24 Hrs  T(C): 35.9 (05 Apr 2022 05:54), Max: 37.4 (04 Apr 2022 22:57)  T(F): 96.6 (05 Apr 2022 05:54), Max: 99.3 (04 Apr 2022 22:57)  HR: 61 (05 Apr 2022 05:54) (59 - 69)  BP: 108/55 (05 Apr 2022 05:54) (104/62 - 122/55)  BP(mean): 78 (04 Apr 2022 19:08) (78 - 86)  RR: 16 (05 Apr 2022 05:54) (13 - 33)  SpO2: 100% (05 Apr 2022 09:47) (99% - 100%)    Neurological Exam:   Mental status: Awake, alert and oriented x3.  Recent and remote memory intact.  Naming, repetition and comprehension intact.  Attention/concentration intact.  No dysarthria, no aphasia.  Fund of knowledge appropriate.    Cranial nerves: Pupils equally round and reactive to light, visual fields full, no nystagmus, extraocular muscles intact, V1 through V3 intact bilaterally and symmetric, face symmetric, hearing intact to finger rub, palate elevation symmetric, tongue was midline.  Motor:  MRC grading 5/5 b/l UE/LE.   strength 5/5.  Normal tone and bulk.  No abnormal movements.    Sensation: Intact to light touch, proprioception, and pinprick.   Coordination: No dysmetria on finger-to-nose and heel-to-shin.  No dysdiadokinesia.  Reflexes: 2+ in bilateral UE/LE, downgoing toes bilaterally. (-) Moser.  Gait: Narrow and steady. No ataxia.  Romberg negative    Labs:  CBC Full  -  ( 05 Apr 2022 05:40 )  WBC Count : 6.39 K/uL  RBC Count : 4.08 M/uL  Hemoglobin : 11.6 g/dL  Hematocrit : 35.7 %  Platelet Count - Automated : 297 K/uL  Mean Cell Volume : 87.5 fL  Mean Cell Hemoglobin : 28.4 pg  Mean Cell Hemoglobin Concentration : 32.5 g/dL  Auto Neutrophil # : 3.04 K/uL  Auto Lymphocyte # : 2.70 K/uL  Auto Monocyte # : 0.48 K/uL  Auto Eosinophil # : 0.13 K/uL  Auto Basophil # : 0.03 K/uL  Auto Neutrophil % : 47.5 %  Auto Lymphocyte % : 42.3 %  Auto Monocyte % : 7.5 %  Auto Eosinophil % : 2.0 %  Auto Basophil % : 0.5 %    04-05    134<L>  |  101  |  15  ----------------------------<  84  4.1   |  23  |  0.8    Ca    9.0      05 Apr 2022 05:40  Mg     1.9     04-05    TPro  5.9<L>  /  Alb  3.8  /  TBili  0.3  /  DBili  x   /  AST  13  /  ALT  9   /  AlkPhos  85  04-05    LIVER FUNCTIONS - ( 05 Apr 2022 05:40 )  Alb: 3.8 g/dL / Pro: 5.9 g/dL / ALK PHOS: 85 U/L / ALT: 9 U/L / AST: 13 U/L / GGT: x           < from: EEG (04.04.22 @ 09:20) >  Impression:  Normal  Abnormal due to the presence of: generalized slowing as above    Clinical Correlation & Recommendations  A normal EEG does not exclude the possibility of seizure disorder.   Clinical correlation is recommended.      Reviewed by:  Herberth Aguirre    Signed by:  Herberth Aguirre    --- End of Report ---    HERBERTH AGUIRRE MD  This document has been electronically signed. Apr 5 2022  9:27AM    < end of copied text >    < from: MR Head No Cont (04.04.22 @ 15:11) >  IMPRESSION:    Unremarkable MRI of the brain. No acute infarct.    --- End of Report ---    AMNA NOLAND MD; Attending Radiologist  This document has been electronically signed. Apr 4 2022  3:40PM    < end of copied text >    < from: MR Angio Neck No Cont (04.04.22 @ 15:11) >  IMPRESSION:    No evidence of major vascular stenosis or occlusion.    --- End of Report ---    AMNA NOLAND MD; Attending Radiologist  This document has been electronically signed. Apr 4 2022  3:39PM    < end of copied text >    < from: CT Angio Neck w/ IV Cont (04.03.22 @ 06:34) >  IMPRESSION:    CT PERFUSION: No regional perfusion abnormality.    CTA BRAIN: Patent intracranial circulation. No flow-limiting stenosis or   occlusion.    CTA NECK: Patent anterior and posterior circulations without significant   ICA stenosis as per NASCET criteria.    --- End of Report ---      STEPH ESTEBAN MD; Resident Radiologist  This document has been electronically signed.  LEDY DVAIES MD; Attending Radiologist  This document has been electronically signed. Apr  3 2022  7:22AM    < end of copied text >

## 2022-04-05 NOTE — DISCHARGE NOTE PROVIDER - NSDCCPCAREPLAN_GEN_ALL_CORE_FT
PRINCIPAL DISCHARGE DIAGNOSIS  Diagnosis: Cerebrovascular accident (CVA)  Assessment and Plan of Treatment: sent medications to pharmacy:  plavix 75 mg daily  lipitor 80 mg daily  continue aspirin 81 mg daily  follow up in stroke clinic in 2 weeks

## 2022-04-05 NOTE — DISCHARGE NOTE NURSING/CASE MANAGEMENT/SOCIAL WORK - NSDCPEFALRISK_GEN_ALL_CORE
For information on Fall & Injury Prevention, visit: [email or web address] OR  [email or web address] OR  https://www.cdc.gov/steadi/patient.html

## 2022-04-05 NOTE — DISCHARGE NOTE PROVIDER - PROVIDER TOKENS
PROVIDER:[TOKEN:[[id number]],FOLLOWUP:[2 weeks]],PROVIDER:[TOKEN:[[id number]],FOLLOWUP:[1 week]]

## 2022-04-05 NOTE — PROGRESS NOTE ADULT - ASSESSMENT
A 52 y/o female presenting with left sided numbness and weakness given TPA in the ED currently back to baseline with complete resolution of symptoms and negative neuroimaging.  TIA vs post-ictal syndrome currently stable with negative workup.      Plan:   - ASA 81 mg daily and add plavix 75 daily for 21 days  - hypercoagulable labs pending  - continue statin   - continue home AEDs (follows w/ Dr. Pacheco)  - f/u Echo - if normal, may d/c w/ outpt stroke clinic f/u in 2 wks

## 2022-04-05 NOTE — DISCHARGE NOTE PROVIDER - NSDCPNSUBOBJ_GEN_ALL_CORE
Pt was seen and examined at the bedside.  reports having nasal congestion and coughing. RVP negative.  stroke work up - MRI, TTE negative.  Pt to follow outpatient in Stroke Clinic.

## 2022-04-05 NOTE — PROGRESS NOTE ADULT - SUBJECTIVE AND OBJECTIVE BOX
PARTHA LIVE  51y, Female  Allergy: No Known Allergies    Hospital Day: 2d    Patient seen and examined earlier today.  reports cough and respiratory symptoms.     PMH/PSH:  PAST MEDICAL & SURGICAL HISTORY:  Seizure    At high risk for seizures    Arthritis    Back injury    Right shoulder injury  tear    Left knee injury  tear    Atypical polypoid adenomyoma of uterus    H/O breast biopsy  left 1990&#x27;s    H/O colonoscopy  1 year ago        LAST 24-Hr EVENTS:    VITALS:  T(F): 96.6 (04-05-22 @ 05:54), Max: 99.3 (04-04-22 @ 22:57)  HR: 61 (04-05-22 @ 05:54)  BP: 108/55 (04-05-22 @ 05:54) (104/62 - 122/55)  RR: 16 (04-05-22 @ 05:54)  SpO2: 100% (04-05-22 @ 09:47)          TESTS & MEASUREMENTS:  Weight/BMI  116.8 (04-04-22 @ 22:57)  48.7 (04-04-22 @ 22:57)    04-03-22 @ 07:01  -  04-04-22 @ 07:00  --------------------------------------------------------  IN: 1475 mL / OUT: 1250 mL / NET: 225 mL    04-04-22 @ 07:01  -  04-05-22 @ 07:00  --------------------------------------------------------  IN: 465 mL / OUT: 0 mL / NET: 465 mL                            11.6   6.39  )-----------( 297      ( 05 Apr 2022 05:40 )             35.7       INR: 1.03 ratio (04-03-22 @ 04:21)    04-05    134<L>  |  101  |  15  ----------------------------<  84  4.1   |  23  |  0.8    Ca    9.0      05 Apr 2022 05:40  Mg     1.9     04-05    TPro  5.9<L>  /  Alb  3.8  /  TBili  0.3  /  DBili  x   /  AST  13  /  ALT  9   /  AlkPhos  85  04-05    LIVER FUNCTIONS - ( 05 Apr 2022 05:40 )  Alb: 3.8 g/dL / Pro: 5.9 g/dL / ALK PHOS: 85 U/L / ALT: 9 U/L / AST: 13 U/L / GGT: x                           COVID-19 PCR: NotDetec (04-03-22 @ 04:21)        A1C with Estimated Average Glucose Result: 5.6 % (04-03-22 @ 12:21)          RADIOLOGY, ECG, & ADDITIONAL TESTS:    CT Head No Cont:   ACC: [id number] EXAM:  CT BRAIN                          PROCEDURE DATE:  04/04/2022          INTERPRETATION:  CLINICAL INDICATION: Status post TPA. Left-sided   weakness.    Technique: CT of the head was performed without contrast.    Multiple contiguous axial images were acquired from the skullbase to the   vertex without the administration of intravenous contrast.  Coronal and   sagittal reformations were made.    COMPARISON:  prior head CT dated 4/3/2022    FINDINGS:    The ventricles and sulci are unremarkable in appearance.     There is no intraparenchymal hematoma, mass effect or midline shift. No   abnormal extra-axial fluid collections are present.    The calvarium is intact. The visualized intraorbital compartments,   paranasal sinuses and mastoid complexes appear free of acute disease.   There is a partially empty sella.    IMPRESSION:  No acute intracranial pathology. No evidence of midline shift, mass   effect or intracranial hemorrhage.    --- End of Report ---            MARIN GOMEZ MD; Attending Radiologist  This document has been electronically signed. Apr 4 2022  8:51AM (04-04-22 @ 08:39)    RECENT DIAGNOSTIC ORDERS:  Xray Chest 1 View- PORTABLE-Urgent: Urgent   Indication: sob, cough  Transport: Portable,  w/ Monitor  Exam Completed (04-05-22 @ 10:36)  Respiratory Viral Panel with COVID-19 by ELEANOR: Routine (04-05-22 @ 10:36)  Culture - Sputum: Routine  Specimen Source: Sputum (04-05-22 @ 06:57)      MEDICATIONS:  MEDICATIONS  (STANDING):  aspirin  chewable 81 milliGRAM(s) Oral daily  atorvastatin 80 milliGRAM(s) Oral at bedtime  chlorhexidine 4% Liquid 1 Application(s) Topical <User Schedule>  clopidogrel Tablet 75 milliGRAM(s) Oral daily  lamoTRIgine 250 milliGRAM(s) Oral daily  levETIRAcetam 1250 milliGRAM(s) Oral two times a day    MEDICATIONS  (PRN):  guaifenesin/dextromethorphan Oral Liquid 10 milliLiter(s) Oral every 4 hours PRN Cough  sodium chloride 0.65% Nasal 2 Spray(s) Both Nostrils every 2 hours PRN Nasal Congestion      HOME MEDICATIONS:  aspirin 81 mg oral tablet (04-03)  Keppra 1000 mg oral tablet (04-03)  Keppra 250 mg oral tablet (04-03)  LaMICtal (04-03)  Lipitor 40 mg oral tablet (04-03)      PHYSICAL EXAM:  GENERAL: NAD  HEAD:  Atraumatic, Normocephalic  EYES: EOMI, PERRLA, conjunctiva and sclera clear  NERVOUS SYSTEM:  Alert & Oriented X3, no focal deficits   CHEST/LUNG: Clear to percussion bilaterally; No rales, rhonchi, wheezing, or rubs  HEART: Regular rate and rhythm; No murmurs, rubs, or gallops  ABDOMEN: Soft, Nontender, Nondistended; Bowel sounds present  EXTREMITIES:  2+ Peripheral Pulses, No clubbing, cyanosis, or edema

## 2022-04-05 NOTE — DISCHARGE NOTE PROVIDER - CARE PROVIDER_API CALL
Jory Maddox)  Neurology  [address] [place] [id number]  Phone: [phone]  Fax: [phone]  Follow Up Time: 2 weeks    JO-ANN MANDEL  [address] [address]  Phone: ()-  Fax: ([phone]  Follow Up Time: 1 week

## 2022-04-05 NOTE — DISCHARGE NOTE NURSING/CASE MANAGEMENT/SOCIAL WORK - PATIENT PORTAL LINK FT
You can access the FollowMyHealth Patient Portal offered by [facility] by registering at the following website: [email or web address]. By joining [employer]’s FollowMyHealth portal, you will also be able to view your health information using other applications (apps) compatible with our system.

## 2022-04-05 NOTE — DISCHARGE NOTE PROVIDER - NSDCMRMEDTOKEN_GEN_ALL_CORE_FT
aspirin 81 mg oral tablet: 1 tab(s) orally once a month  clopidogrel 75 mg oral tablet: 1 tab(s) orally once a day  Keppra 1000 mg oral tablet: 1 tab(s) orally 2 times a day  Keppra 250 mg oral tablet: 1 tab(s) orally 2 times a day  LaMICtal: 250 milligram(s) orally once a day  Lipitor 80 mg oral tablet: 1 tab(s) orally once a day (at bedtime)

## 2022-04-05 NOTE — DISCHARGE NOTE PROVIDER - HOSPITAL COURSE
51 Y F with pmh of carotid artery disease, h/o seizures presents to ED with left sided weakness. She states that at 2AM she started to have left sided numbness and weakness from her neck down to her leg. Had similar sxs a few months ago that resolved on own, but today they persisted, decided to come to ED. Last few days had been having URI sxs, was taking herbal teas. Came to ED, decision made to give tPA.    #Suspected CVA due to Left Sided Weakness - resolved s/p TPA  #Carotid Artery Disease   #Hx of Seizures   CT Head No Cont (04.04.22 @ 08:39)   No acute intracranial pathology. No evidence of midline shift, mass   effect or intracranial hemorrhage.  MR Head No Cont (04.04.22 @ 15:11)   Unremarkable MRI of the brain. No acute infarct.  MR Angio Neck No Cont (04.04.22 @ 15:11)   No evidence of major vascular stenosis or occlusion.  TTE normal  medically stable for dc with aspirin, plavix for 21 days, and statin  follow up in stroke clinic in 2 weeks  f/u HyperCoag labs   c/w Home AEDs

## 2022-04-05 NOTE — PROGRESS NOTE ADULT - ASSESSMENT
51 Y F with pmh of carotid artery disease, h/o seizures presents to ED with left sided weakness. She states that at 2AM she started to have left sided numbness and weakness from her neck down to her leg. Had similar sxs a few months ago that resolved on own, but today they persisted, decided to come to ED. Last few days had been having URI sxs, was taking herbal teas. Came to ED, decision made to give tPA.    #Suspected CVA due to Left Sided Weakness - resolved s/p TPA  #Carotid Artery Disease   #Hx of Seizures   CT Head No Cont (04.04.22 @ 08:39)   No acute intracranial pathology. No evidence of midline shift, mass   effect or intracranial hemorrhage.  MR Head No Cont (04.04.22 @ 15:11)   Unremarkable MRI of the brain. No acute infarct.  MR Angio Neck No Cont (04.04.22 @ 15:11)   No evidence of major vascular stenosis or occlusion.  c/w ASA and Added Plavix x 21 days  c/w Statin   f/u HyperCoag labs   c/w Home AEDs  f/u Echo - if normal will dc     #Respiratory Symptoms w/ Dyspnea  f/u RVP    f/u Chest Pain    DVT PPx Lovenox     Progress Note Handoff:   Pending: Echo   Dispo: acute

## 2022-04-06 LAB
GRAM STN FLD: SIGNIFICANT CHANGE UP
LAMOTRIGINE SERPL-MCNC: 11.1 UG/ML — SIGNIFICANT CHANGE UP (ref 2–20)
SPECIMEN SOURCE: SIGNIFICANT CHANGE UP

## 2022-04-07 LAB
-  AMIKACIN: SIGNIFICANT CHANGE UP
-  AMOXICILLIN/CLAVULANIC ACID: SIGNIFICANT CHANGE UP
-  AMPICILLIN/SULBACTAM: SIGNIFICANT CHANGE UP
-  AMPICILLIN: SIGNIFICANT CHANGE UP
-  AZTREONAM: SIGNIFICANT CHANGE UP
-  CEFAZOLIN: SIGNIFICANT CHANGE UP
-  CEFEPIME: SIGNIFICANT CHANGE UP
-  CEFOXITIN: SIGNIFICANT CHANGE UP
-  CEFTRIAXONE: SIGNIFICANT CHANGE UP
-  CIPROFLOXACIN: SIGNIFICANT CHANGE UP
-  ERTAPENEM: SIGNIFICANT CHANGE UP
-  GENTAMICIN: SIGNIFICANT CHANGE UP
-  IMIPENEM: SIGNIFICANT CHANGE UP
-  LEVOFLOXACIN: SIGNIFICANT CHANGE UP
-  MEROPENEM: SIGNIFICANT CHANGE UP
-  PIPERACILLIN/TAZOBACTAM: SIGNIFICANT CHANGE UP
-  TOBRAMYCIN: SIGNIFICANT CHANGE UP
-  TRIMETHOPRIM/SULFAMETHOXAZOLE: SIGNIFICANT CHANGE UP
CULTURE RESULTS: SIGNIFICANT CHANGE UP
METHOD TYPE: SIGNIFICANT CHANGE UP
ORGANISM # SPEC MICROSCOPIC CNT: SIGNIFICANT CHANGE UP
ORGANISM # SPEC MICROSCOPIC CNT: SIGNIFICANT CHANGE UP
SPECIMEN SOURCE: SIGNIFICANT CHANGE UP

## 2022-04-10 DIAGNOSIS — I63.9 CEREBRAL INFARCTION, UNSPECIFIED: ICD-10-CM

## 2022-04-10 DIAGNOSIS — G81.94 HEMIPLEGIA, UNSPECIFIED AFFECTING LEFT NONDOMINANT SIDE: ICD-10-CM

## 2022-04-10 DIAGNOSIS — I77.9 DISORDER OF ARTERIES AND ARTERIOLES, UNSPECIFIED: ICD-10-CM

## 2022-04-10 DIAGNOSIS — R29.705 NIHSS SCORE 5: ICD-10-CM

## 2022-04-10 DIAGNOSIS — G40.909 EPILEPSY, UNSPECIFIED, NOT INTRACTABLE, WITHOUT STATUS EPILEPTICUS: ICD-10-CM

## 2022-04-10 DIAGNOSIS — Z91.14 PATIENT'S OTHER NONCOMPLIANCE WITH MEDICATION REGIMEN: ICD-10-CM

## 2022-04-10 LAB — LEVETIRACETAM SERPL-MCNC: 54.6 UG/ML — HIGH (ref 10–40)

## 2022-05-18 ENCOUNTER — APPOINTMENT (OUTPATIENT)
Dept: NEUROLOGY | Facility: CLINIC | Age: 52
End: 2022-05-18
Payer: MEDICARE

## 2022-05-18 VITALS
HEART RATE: 64 BPM | HEIGHT: 61 IN | SYSTOLIC BLOOD PRESSURE: 125 MMHG | OXYGEN SATURATION: 99 % | WEIGHT: 245 LBS | TEMPERATURE: 97.6 F | BODY MASS INDEX: 46.26 KG/M2 | DIASTOLIC BLOOD PRESSURE: 84 MMHG

## 2022-05-18 PROCEDURE — 99215 OFFICE O/P EST HI 40 MIN: CPT

## 2022-05-18 NOTE — HISTORY OF PRESENT ILLNESS
[FreeTextEntry1] : Patient 51 yo RH woman, family from [place], with PMHx of b/l carotid artery disease dx 1 yr ago/was started on Lipitor 40 QD and seizures (first in 1993 at age 26, following with Dr. Pacheco, last episode was in 2013) and PSHx of R shoulder reconstruction, who presents as HFU from 4/3/22 for TIA vs post-ictal syndrome with negative stroke wk up and REEG.\par \par She pw/co of L-sided numbness and weakness that traveled from her neck down to her leg. She had similar sx a few months ago that resolved on its own, but this episode persisted so she came to the ED. At the same time, she also has URI. NIHSS 5 for LUE/LLE some effort against gravity and mild to mod sensory deficit. TPA was given and sx resolved afterwards. She also c/o of neck pain intermittently. \par \par MRI H/MRA N were both negative.  \par CTH Neg \par CTP Neg \par CTA H: Patent intracranial circulation. No flow-limiting stenosis or occlusion. \par CTA N: Patent anterior and posterior circulations without significant ICA stenosis as per NASCET criteria. \par \par REEG inpatient was negative. She follows with Dr. Pacheco for AEDs.  \par Echo w/bubble: NO PFO and EF 72%\par \par LDL 57 and A1c 5.6 \par Meds: ASA 81, Plavix 75, Lipitor 80 and Keppra 1250 BID  \par **Inpatient Keppra Level was 54.6 \par \par Today, she states that she has followed with Dr. Pacheco who said that there was some shadowing in the R cerebral hemisphere, but I could not appreciate anything. She is on Lipitor 40 per cardiologist. She is taking herbal teas, like echinacea and chlorophyll/ juicing for weight loss. She has about 1wk Holter Monitor that was negative.  She denied HA at time of episode. \par \par SHx: Lost partner to COVID as well as 10 ppl close to her during COVID \par Never smoker, NO FMHx of stroke/MI under 55, NO Rheum condition to her knowledge \par \par

## 2022-05-18 NOTE — ASSESSMENT
[FreeTextEntry1] : Patient 53 yo RH woman, family from [place], with PMHx of b/l carotid artery disease dx 1 yr ago/was started on Lipitor 40 QD and seizures (first in 1993 at age 26, following with Dr. Pacheco, last episode was in 2013) and PSHx of R shoulder reconstruction, who presents as HFU from 4/3/22 for TIA vs stroke aborted by TPA vs post-ictal syndrome with negative stroke and REEG workup. Neuro exam is non focal today. She does NOT have significant ICA disease and only very mild to no cerebral microvascular ischemic change. I can't pinpoint etiology of her episode as for now. This far Holter monitor neg and NO PFO. \par \par PLAN: \par -C/w ASA 81 and Lipitor 40 \par -Repeat lipid panel and adjust statin, goal LDL <70\par -F/u with Dr. Pacheco for Keppra level and seizures \par -Check Hypercoag labs \par -F/u in 4-6 months with Dr. Maddox for etiology

## 2022-05-18 NOTE — PHYSICAL EXAM
[FreeTextEntry1] : Focal neurological exam:\par \par MS: Awake, alert, oriented to person, place, situation and time. Normal affect. Follows commands. \par \par Language: Speech is clear, fluent with good repetition & comprehension. No dysarthria. \par \par CNs 2 - 12 intact. EOMI no nystagmus, no diplopia. VFF. No facial asymmetry b/l, full eye closure strength b/l. Hearing grossly normal. Head turning & shoulder shrug intact b/l. Tongue midline, normal movements, no atrophy.\par \par Motor: Normal muscle bulk & tone. No noticeable tremor or seizure. No pronator drift. Muscle strength of b/l UE and b/l LE 5/5. \par \par Reflexes: DTR of biceps 2+, knees absent  \par \par Sensation: Intact to LT, temperature and vibration b/l throughout\par \par Cortical: No extinction\par \par Coordination: No dysmetria to FTN.\par \par Gait: No postural instability. Normal stance and tandem gait.\par \par NIHSS 0\par \par \par \par \par

## 2022-06-14 ENCOUNTER — NON-APPOINTMENT (OUTPATIENT)
Age: 52
End: 2022-06-14

## 2022-08-01 ENCOUNTER — NON-APPOINTMENT (OUTPATIENT)
Age: 52
End: 2022-08-01

## 2022-10-19 NOTE — ED ADULT NURSE NOTE - BREATHING
Dad given discharge instructions and follow up information. Dosing guide provided. Patient resting comfortably at this time. Mom denies any further questions. Pt breathing easy and unlabored, lung sounds clear to auscultation. Pt is alert and oriented. Patient discharged home.    spontaneous

## 2022-12-01 ENCOUNTER — APPOINTMENT (OUTPATIENT)
Dept: NEUROLOGY | Facility: CLINIC | Age: 52
End: 2022-12-01

## 2023-04-10 NOTE — PATIENT PROFILE ADULT - PATIENT'S PREFERRED PRONOUN
Thalidomide Counseling: I discussed with the patient the risks of thalidomide including but not limited to birth defects, anxiety, weakness, chest pain, dizziness, cough and severe allergy. Her/She

## 2023-08-30 ENCOUNTER — NON-APPOINTMENT (OUTPATIENT)
Age: 53
End: 2023-08-30

## 2023-08-30 DIAGNOSIS — M54.2 CERVICALGIA: ICD-10-CM

## 2023-08-30 DIAGNOSIS — Z87.39 PERSONAL HISTORY OF OTHER DISEASES OF THE MUSCULOSKELETAL SYSTEM AND CONNECTIVE TISSUE: ICD-10-CM

## 2023-08-30 DIAGNOSIS — G43.009 MIGRAINE W/OUT AURA, NOT INTRACTABLE, W/OUT STATUS MIGRAINOSUS: ICD-10-CM

## 2023-08-30 DIAGNOSIS — S06.9XAA UNSPECIFIED INTRACRANIAL INJURY WITH LOSS OF CONSCIOUSNESS STATUS UNKNOWN, INITIAL ENCOUNTER: ICD-10-CM

## 2023-08-30 DIAGNOSIS — E66.3 OVERWEIGHT: ICD-10-CM

## 2023-08-30 DIAGNOSIS — G89.29 CERVICALGIA: ICD-10-CM

## 2023-09-27 ENCOUNTER — NON-APPOINTMENT (OUTPATIENT)
Age: 53
End: 2023-09-27

## 2023-09-27 DIAGNOSIS — Z92.89 PERSONAL HISTORY OF OTHER MEDICAL TREATMENT: ICD-10-CM

## 2023-11-08 ENCOUNTER — APPOINTMENT (OUTPATIENT)
Dept: NEUROLOGY | Facility: CLINIC | Age: 53
End: 2023-11-08
Payer: MEDICARE

## 2023-11-08 PROCEDURE — 95816 EEG AWAKE AND DROWSY: CPT

## 2023-11-14 ENCOUNTER — APPOINTMENT (OUTPATIENT)
Dept: NEUROLOGY | Facility: CLINIC | Age: 53
End: 2023-11-14
Payer: MEDICARE

## 2023-11-14 DIAGNOSIS — Z86.73 PERSONAL HISTORY OF TRANSIENT ISCHEMIC ATTACK (TIA), AND CEREBRAL INFARCTION W/OUT RESIDUAL DEFICITS: ICD-10-CM

## 2023-11-14 DIAGNOSIS — G40.209 LOCALIZATION-RELATED (FOCAL) (PARTIAL) SYMPTOMATIC EPILEPSY AND EPILEPTIC SYNDROMES WITH COMPLEX PARTIAL SEIZURES, NOT INTRACTABLE, W/OUT STATUS EPILEPTICUS: ICD-10-CM

## 2023-11-14 DIAGNOSIS — R29.818 OTHER SYMPTOMS AND SIGNS INVOLVING THE NERVOUS SYSTEM: ICD-10-CM

## 2023-11-14 PROCEDURE — 99213 OFFICE O/P EST LOW 20 MIN: CPT

## 2023-11-14 RX ORDER — MESALAMINE 1000 MG/1
1000 SUPPOSITORY RECTAL
Qty: 30 | Refills: 2 | Status: DISCONTINUED | COMMUNITY
Start: 2020-09-24 | End: 2023-11-14

## 2023-11-14 RX ORDER — ATORVASTATIN CALCIUM 10 MG/1
10 TABLET, FILM COATED ORAL
Refills: 0 | Status: ACTIVE | COMMUNITY

## 2023-11-14 RX ORDER — LAMOTRIGINE 250 MG/1
250 TABLET, EXTENDED RELEASE ORAL DAILY
Qty: 90 | Refills: 3 | Status: ACTIVE | COMMUNITY
Start: 2023-11-14 | End: 1900-01-01

## 2023-11-14 RX ORDER — MOMETASONE 50 UG/1
50 SPRAY, METERED NASAL DAILY
Qty: 1 | Refills: 3 | Status: DISCONTINUED | COMMUNITY
Start: 2021-07-01 | End: 2023-11-14

## 2023-11-14 RX ORDER — LAMOTRIGINE 2 MG/1
TABLET, FOR SUSPENSION ORAL
Refills: 0 | Status: DISCONTINUED | COMMUNITY
End: 2023-11-14

## 2023-11-14 RX ORDER — LEVOCETIRIZINE DIHYDROCHLORIDE 5 MG/1
5 TABLET ORAL DAILY
Qty: 1 | Refills: 2 | Status: DISCONTINUED | COMMUNITY
Start: 2021-07-01 | End: 2023-11-14

## 2023-11-14 RX ORDER — PANTOPRAZOLE 40 MG/1
40 TABLET, DELAYED RELEASE ORAL DAILY
Qty: 1 | Refills: 3 | Status: DISCONTINUED | COMMUNITY
Start: 2020-09-16 | End: 2023-11-14

## 2023-11-14 RX ORDER — ALBUTEROL SULFATE 90 UG/1
108 (90 BASE) INHALANT RESPIRATORY (INHALATION)
Refills: 0 | Status: DISCONTINUED | COMMUNITY
End: 2023-11-14

## 2023-11-14 RX ORDER — POLYETHYLENE GLYCOL 3350 AND ELECTROLYTES WITH LEMON FLAVOR 236; 22.74; 6.74; 5.86; 2.97 G/4L; G/4L; G/4L; G/4L; G/4L
236 POWDER, FOR SOLUTION ORAL
Qty: 1 | Refills: 0 | Status: DISCONTINUED | COMMUNITY
Start: 2020-09-16 | End: 2023-11-14

## 2023-11-14 RX ORDER — LEVETIRACETAM 250 MG/1
250 TABLET, FILM COATED ORAL
Refills: 0 | Status: DISCONTINUED | COMMUNITY
End: 2023-11-14

## 2023-11-14 RX ORDER — MELOXICAM 15 MG/1
TABLET ORAL
Refills: 0 | Status: DISCONTINUED | COMMUNITY
End: 2023-11-14

## 2023-11-14 RX ORDER — DOCUSATE SODIUM 100 MG/1
100 CAPSULE ORAL TWICE DAILY
Qty: 60 | Refills: 5 | Status: DISCONTINUED | COMMUNITY
Start: 2020-09-24 | End: 2023-11-14

## 2023-11-14 RX ORDER — LEVETIRACETAM 1000 MG/1
1000 TABLET, FILM COATED ORAL TWICE DAILY
Qty: 180 | Refills: 3 | Status: ACTIVE | COMMUNITY
Start: 1900-01-01 | End: 1900-01-01

## 2023-11-14 RX ORDER — METHYLPREDNISOLONE 4 MG/1
4 TABLET ORAL
Qty: 1 | Refills: 0 | Status: DISCONTINUED | COMMUNITY
Start: 2021-07-01 | End: 2023-11-14

## 2023-11-14 RX ORDER — NAPROXEN 500 MG/1
TABLET ORAL
Refills: 0 | Status: DISCONTINUED | COMMUNITY
End: 2023-11-14

## 2023-11-14 RX ORDER — TIZANIDINE HYDROCHLORIDE 4 MG/1
4 CAPSULE ORAL
Refills: 0 | Status: DISCONTINUED | COMMUNITY
End: 2023-11-14

## 2024-07-03 ENCOUNTER — EMERGENCY (EMERGENCY)
Facility: HOSPITAL | Age: 54
LOS: 0 days | Discharge: HOME | End: 2024-07-04
Attending: EMERGENCY MEDICINE
Payer: MEDICARE

## 2024-07-03 VITALS
DIASTOLIC BLOOD PRESSURE: 68 MMHG | OXYGEN SATURATION: 100 % | HEART RATE: 64 BPM | TEMPERATURE: 98 F | SYSTOLIC BLOOD PRESSURE: 119 MMHG | RESPIRATION RATE: 20 BRPM | WEIGHT: 235.01 LBS

## 2024-07-03 DIAGNOSIS — Z98.890 OTHER SPECIFIED POSTPROCEDURAL STATES: Chronic | ICD-10-CM

## 2024-07-03 DIAGNOSIS — D26.9 OTHER BENIGN NEOPLASM OF UTERUS, UNSPECIFIED: Chronic | ICD-10-CM
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
## 2024-07-03 DIAGNOSIS — S60.221A CONTUSION OF RIGHT HAND, INITIAL ENCOUNTER: ICD-10-CM

## 2024-07-03 DIAGNOSIS — M25.531 PAIN IN RIGHT WRIST: ICD-10-CM
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
## 2024-07-03 PROCEDURE — 29130 APPL FINGER SPLINT STATIC: CPT | Mod: RT

## 2024-07-03 PROCEDURE — 73130 X-RAY EXAM OF HAND: CPT | Mod: RT

## 2024-07-03 PROCEDURE — 99284 EMERGENCY DEPT VISIT MOD MDM: CPT | Mod: 25

## 2024-07-03 PROCEDURE — 29125 APPL SHORT ARM SPLINT STATIC: CPT | Mod: RT

## 2024-07-03 PROCEDURE — 73110 X-RAY EXAM OF WRIST: CPT | Mod: RT

## 2024-07-04 PROCEDURE — 73110 X-RAY EXAM OF WRIST: CPT | Mod: 26,RT

## 2024-07-04 PROCEDURE — 73130 X-RAY EXAM OF HAND: CPT | Mod: 26,RT

## 2024-07-21 ENCOUNTER — NON-APPOINTMENT (OUTPATIENT)
Age: 54
End: 2024-07-21

## 2024-08-04 ENCOUNTER — NON-APPOINTMENT (OUTPATIENT)
Age: 54
End: 2024-08-04

## 2024-08-27 ENCOUNTER — APPOINTMENT (OUTPATIENT)
Dept: NEUROLOGY | Facility: CLINIC | Age: 54
End: 2024-08-27

## 2024-11-09 ENCOUNTER — NON-APPOINTMENT (OUTPATIENT)
Age: 54
End: 2024-11-09

## 2024-12-20 ENCOUNTER — APPOINTMENT (OUTPATIENT)
Dept: NEUROLOGY | Facility: CLINIC | Age: 54
End: 2024-12-20
Payer: MEDICARE

## 2024-12-20 VITALS
DIASTOLIC BLOOD PRESSURE: 66 MMHG | OXYGEN SATURATION: 99 % | SYSTOLIC BLOOD PRESSURE: 97 MMHG | WEIGHT: 215 LBS | BODY MASS INDEX: 40.59 KG/M2 | RESPIRATION RATE: 17 BRPM | HEART RATE: 59 BPM | HEIGHT: 61 IN

## 2024-12-20 DIAGNOSIS — G43.009 MIGRAINE W/OUT AURA, NOT INTRACTABLE, W/OUT STATUS MIGRAINOSUS: ICD-10-CM

## 2024-12-20 DIAGNOSIS — G40.109 LOCALIZATION-RELATED (FOCAL) (PARTIAL) SYMPTOMATIC EPILEPSY AND EPILEPTIC SYNDROMES WITH SIMPLE PARTIAL SEIZURES, NOT INTRACTABLE, W/OUT STATUS EPILEPTICUS: ICD-10-CM

## 2024-12-20 DIAGNOSIS — Z86.73 PERSONAL HISTORY OF TRANSIENT ISCHEMIC ATTACK (TIA), AND CEREBRAL INFARCTION W/OUT RESIDUAL DEFICITS: ICD-10-CM

## 2024-12-20 DIAGNOSIS — G40.209 LOCALIZATION-RELATED (FOCAL) (PARTIAL) SYMPTOMATIC EPILEPSY AND EPILEPTIC SYNDROMES WITH COMPLEX PARTIAL SEIZURES, NOT INTRACTABLE, W/OUT STATUS EPILEPTICUS: ICD-10-CM

## 2024-12-20 PROCEDURE — 99214 OFFICE O/P EST MOD 30 MIN: CPT

## 2025-06-04 ENCOUNTER — NON-APPOINTMENT (OUTPATIENT)
Age: 55
End: 2025-06-04

## 2025-06-05 ENCOUNTER — EMERGENCY (EMERGENCY)
Facility: HOSPITAL | Age: 55
LOS: 0 days | Discharge: HOME | End: 2025-06-05
Attending: STUDENT IN AN ORGANIZED HEALTH CARE EDUCATION/TRAINING PROGRAM
Payer: MEDICARE

## 2025-06-05 VITALS
RESPIRATION RATE: 18 BRPM | OXYGEN SATURATION: 97 % | HEART RATE: 57 BPM | SYSTOLIC BLOOD PRESSURE: 105 MMHG | DIASTOLIC BLOOD PRESSURE: 74 MMHG

## 2025-06-05 VITALS
SYSTOLIC BLOOD PRESSURE: 127 MMHG | DIASTOLIC BLOOD PRESSURE: 83 MMHG | RESPIRATION RATE: 18 BRPM | TEMPERATURE: 98 F | WEIGHT: 230.82 LBS | HEART RATE: 117 BPM | OXYGEN SATURATION: 95 % | HEIGHT: 65 IN

## 2025-06-05 DIAGNOSIS — Z86.73 PERSONAL HISTORY OF TRANSIENT ISCHEMIC ATTACK (TIA), AND CEREBRAL INFARCTION WITHOUT RESIDUAL DEFICITS: ICD-10-CM

## 2025-06-05 DIAGNOSIS — D26.9 OTHER BENIGN NEOPLASM OF UTERUS, UNSPECIFIED: Chronic | ICD-10-CM

## 2025-06-05 DIAGNOSIS — R20.0 ANESTHESIA OF SKIN: ICD-10-CM

## 2025-06-05 DIAGNOSIS — M79.661 PAIN IN RIGHT LOWER LEG: ICD-10-CM

## 2025-06-05 DIAGNOSIS — Z98.890 OTHER SPECIFIED POSTPROCEDURAL STATES: Chronic | ICD-10-CM

## 2025-06-05 DIAGNOSIS — R20.2 PARESTHESIA OF SKIN: ICD-10-CM

## 2025-06-05 DIAGNOSIS — M79.662 PAIN IN LEFT LOWER LEG: ICD-10-CM

## 2025-06-05 LAB
ALBUMIN SERPL ELPH-MCNC: 4.2 G/DL — SIGNIFICANT CHANGE UP (ref 3.5–5.2)
ALP SERPL-CCNC: 82 U/L — SIGNIFICANT CHANGE UP (ref 30–115)
ALT FLD-CCNC: 12 U/L — SIGNIFICANT CHANGE UP (ref 0–41)
ANION GAP SERPL CALC-SCNC: 12 MMOL/L — SIGNIFICANT CHANGE UP (ref 7–14)
AST SERPL-CCNC: 26 U/L — SIGNIFICANT CHANGE UP (ref 0–41)
BASOPHILS # BLD AUTO: 0.02 K/UL — SIGNIFICANT CHANGE UP (ref 0–0.2)
BASOPHILS NFR BLD AUTO: 0.3 % — SIGNIFICANT CHANGE UP (ref 0–1)
BILIRUB SERPL-MCNC: <0.2 MG/DL — SIGNIFICANT CHANGE UP (ref 0.2–1.2)
BUN SERPL-MCNC: 24 MG/DL — HIGH (ref 10–20)
CALCIUM SERPL-MCNC: 9.2 MG/DL — SIGNIFICANT CHANGE UP (ref 8.4–10.5)
CHLORIDE SERPL-SCNC: 105 MMOL/L — SIGNIFICANT CHANGE UP (ref 98–110)
CO2 SERPL-SCNC: 22 MMOL/L — SIGNIFICANT CHANGE UP (ref 17–32)
CREAT SERPL-MCNC: 1.2 MG/DL — SIGNIFICANT CHANGE UP (ref 0.7–1.5)
EGFR: 53 ML/MIN/1.73M2 — LOW
EGFR: 53 ML/MIN/1.73M2 — LOW
EOSINOPHIL # BLD AUTO: 0.06 K/UL — SIGNIFICANT CHANGE UP (ref 0–0.7)
EOSINOPHIL NFR BLD AUTO: 1 % — SIGNIFICANT CHANGE UP (ref 0–8)
GLUCOSE SERPL-MCNC: 93 MG/DL — SIGNIFICANT CHANGE UP (ref 70–99)
HCT VFR BLD CALC: 38.5 % — SIGNIFICANT CHANGE UP (ref 37–47)
HGB BLD-MCNC: 12.4 G/DL — SIGNIFICANT CHANGE UP (ref 12–16)
IMM GRANULOCYTES NFR BLD AUTO: 0.2 % — SIGNIFICANT CHANGE UP (ref 0.1–0.3)
LYMPHOCYTES # BLD AUTO: 2.59 K/UL — SIGNIFICANT CHANGE UP (ref 1.2–3.4)
LYMPHOCYTES # BLD AUTO: 42.2 % — SIGNIFICANT CHANGE UP (ref 20.5–51.1)
MAGNESIUM SERPL-MCNC: 1.9 MG/DL — SIGNIFICANT CHANGE UP (ref 1.8–2.4)
MCHC RBC-ENTMCNC: 28.6 PG — SIGNIFICANT CHANGE UP (ref 27–31)
MCHC RBC-ENTMCNC: 32.2 G/DL — SIGNIFICANT CHANGE UP (ref 32–37)
MCV RBC AUTO: 88.9 FL — SIGNIFICANT CHANGE UP (ref 81–99)
MONOCYTES # BLD AUTO: 0.51 K/UL — SIGNIFICANT CHANGE UP (ref 0.1–0.6)
MONOCYTES NFR BLD AUTO: 8.3 % — SIGNIFICANT CHANGE UP (ref 1.7–9.3)
NEUTROPHILS # BLD AUTO: 2.95 K/UL — SIGNIFICANT CHANGE UP (ref 1.4–6.5)
NEUTROPHILS NFR BLD AUTO: 48 % — SIGNIFICANT CHANGE UP (ref 42.2–75.2)
NRBC BLD AUTO-RTO: 0 /100 WBCS — SIGNIFICANT CHANGE UP (ref 0–0)
PLATELET # BLD AUTO: 277 K/UL — SIGNIFICANT CHANGE UP (ref 130–400)
PMV BLD: 10.6 FL — HIGH (ref 7.4–10.4)
POTASSIUM SERPL-MCNC: 5.6 MMOL/L — HIGH (ref 3.5–5)
POTASSIUM SERPL-SCNC: 5.6 MMOL/L — HIGH (ref 3.5–5)
PROT SERPL-MCNC: 7.1 G/DL — SIGNIFICANT CHANGE UP (ref 6–8)
RBC # BLD: 4.33 M/UL — SIGNIFICANT CHANGE UP (ref 4.2–5.4)
RBC # FLD: 13.5 % — SIGNIFICANT CHANGE UP (ref 11.5–14.5)
SODIUM SERPL-SCNC: 139 MMOL/L — SIGNIFICANT CHANGE UP (ref 135–146)
WBC # BLD: 6.14 K/UL — SIGNIFICANT CHANGE UP (ref 4.8–10.8)
WBC # FLD AUTO: 6.14 K/UL — SIGNIFICANT CHANGE UP (ref 4.8–10.8)

## 2025-06-05 PROCEDURE — 71046 X-RAY EXAM CHEST 2 VIEWS: CPT | Mod: 26

## 2025-06-05 PROCEDURE — 99285 EMERGENCY DEPT VISIT HI MDM: CPT | Mod: 25

## 2025-06-05 PROCEDURE — 80053 COMPREHEN METABOLIC PANEL: CPT

## 2025-06-05 PROCEDURE — 36000 PLACE NEEDLE IN VEIN: CPT

## 2025-06-05 PROCEDURE — 71046 X-RAY EXAM CHEST 2 VIEWS: CPT

## 2025-06-05 PROCEDURE — 93970 EXTREMITY STUDY: CPT | Mod: 26

## 2025-06-05 PROCEDURE — 93970 EXTREMITY STUDY: CPT

## 2025-06-05 PROCEDURE — 93005 ELECTROCARDIOGRAM TRACING: CPT

## 2025-06-05 PROCEDURE — 85025 COMPLETE CBC W/AUTO DIFF WBC: CPT

## 2025-06-05 PROCEDURE — 83735 ASSAY OF MAGNESIUM: CPT

## 2025-06-05 PROCEDURE — 36415 COLL VENOUS BLD VENIPUNCTURE: CPT

## 2025-06-05 PROCEDURE — 99285 EMERGENCY DEPT VISIT HI MDM: CPT

## 2025-06-05 PROCEDURE — 93010 ELECTROCARDIOGRAM REPORT: CPT

## 2025-06-05 RX ADMIN — Medication 1000 MILLILITER(S): at 18:55

## 2025-06-05 NOTE — ED ADULT TRIAGE NOTE - CHIEF COMPLAINT QUOTE
Pt c/o numbness and tingling in both legs x 5 days, and numbness in hands started last night at 11pm. History of stroke in past

## 2025-06-05 NOTE — ED PROVIDER NOTE - ATTENDING APP SHARED VISIT CONTRIBUTION OF CARE
55-year-old female, past medical history of a TIA with no residual deficits, presenting for pain, pins-and-needles, numbness of bilateral legs for the last 5 days as well as the left arm, no alleviating or aggravating factors, no associated symptoms.  Denies headache, dizziness, chest pain, shortness of breath, nausea, vomiting, weakness, inability to ambulate.    CONSTITUTIONAL: Well-developed; well-nourished; in no acute distress.   SKIN: warm, dry  HEAD: Normocephalic; atraumatic.  EYES: PERRL, EOMI, no conjunctival erythema  ENT: No nasal discharge; airway clear.  NECK: Supple  CARD:  Regular rate and rhythm.   RESP: No wheezes, rales or rhonchi.  ABD: soft ntnd  EXT: Normal ROM.  No clubbing, cyanosis or edema.  No leg swelling or tenderness. DP palpable 2+ bilaterally.  No wounds or skin changes.  Sensation intact equal bilateral UE and LE  NEURO: Alert, oriented, grossly unremarkable. No focal neuro deficits.  Ambulates independently  PSYCH: Cooperative, appropriate.

## 2025-06-05 NOTE — ED PROVIDER NOTE - PHYSICAL EXAMINATION
CONST: Well appearing in NAD  EYES: PERRL, EOMI, Sclera and conjunctiva clear.  ENT: No nasal discharge. Oropharynx normal appearing, no erythema or exudates. No abscess or swelling. Uvula midline.  NECK: Non-tender, no meningeal signs. normal ROM. supple   CARD: Normal S1 S2; Normal rate and rhythm  RESP: Equal BS B/L, No wheezes, rhonchi or rales. No distress  GI: Soft, non-tender, non-distended. no cva tenderness. normal BS  MS: Normal ROM in all extremities. No midline spinal tenderness. pulses 2 +. no calf tenderness or swelling  SKIN: Warm, dry, no acute rashes. Good turgor  NEURO: A&Ox3, No focal deficits. Strength 5/5 with no sensory deficits. Steady gait. Finger to nose intact. Negative pronator drift

## 2025-06-05 NOTE — ED ADULT NURSE NOTE - NSFALLUNIVINTERV_ED_ALL_ED
Bed/Stretcher in lowest position, wheels locked, appropriate side rails in place/Call bell, personal items and telephone in reach/Instruct patient to call for assistance before getting out of bed/chair/stretcher/Non-slip footwear applied when patient is off stretcher/[place] to call system/Physically safe environment - no spills, clutter or unnecessary equipment/Purposeful proactive rounding/Room/bathroom lighting operational, light cord in reach

## 2025-06-05 NOTE — ED PROVIDER NOTE - CLINICAL SUMMARY MEDICAL DECISION MAKING FREE TEXT BOX
55-year-old female, past medical history of a TIA with no residual deficits, presenting for pain, pins-and-needles, numbness of bilateral legs for the last 5 days as well as the left arm, no alleviating or aggravating factors, no associated symptoms.  Denies headache, dizziness, chest pain, shortness of breath, nausea, vomiting, weakness, inability to ambulate. 55-year-old female, past medical history of a TIA with no residual deficits, presenting for pain, pins-and-needles, numbness of bilateral legs for the last 5 days as well as the left arm, no alleviating or aggravating factors, no associated symptoms.  Denies headache, dizziness, chest pain, shortness of breath, nausea, vomiting, weakness, inability to ambulate.  Labs and EKG were ordered and reviewed.  Imaging was ordered and reviewed by me.  US prelim negative for DVT.  Discussed all results.  Given return precautions and follow up outpatient with PMD.  Patient comfortable with plan.

## 2025-06-05 NOTE — ED PROVIDER NOTE - OBJECTIVE STATEMENT
55 year old female with pmhx of tia, and seizures, presents to ed with left arm tingling and bilateral leg pains over last 5 days. pt denies dizziness, ha, visual changes, chest pain, sob, abd pain or nausea, vomiting, diarrhea.

## 2025-06-05 NOTE — ED PROVIDER NOTE - PATIENT PORTAL LINK FT
You can access the FollowMyHealth Patient Portal offered by [facility] by registering at the following website: [email or web address]. By joining [employer]’s FollowMyHealth portal, you will also be able to view your health information using other applications (apps) compatible with our system.

## 2025-06-05 NOTE — ED ADULT NURSE NOTE - NSFALLLASTSIX_ED_ALL_ED
All goals met for discharge      Problem: Pain  Goal: #Acceptable pain level achieved/maintained at rest using NRS/Faces  Description: This goal is used for patients who can self-report.  Acceptable means the level is at or below the identified comfort/function goal.  Outcome: Outcome Met, Complete Goal  Goal: # Acceptable pain level achieved/maintained with activity using NRS/Faces  Description: This goal is used for patients who can self-report and are not achieving acceptable pain control during activity.  Outcome: Outcome Met, Complete Goal  Goal: # Verbalizes understanding of pain management  Description: Documented in Patient Education Activity  Outcome: Outcome Met, Complete Goal     Problem: At Risk for Falls  Goal: # Patient does not fall  Outcome: Outcome Met, Complete Goal  Goal: # Takes action to control fall-related risks  Outcome: Outcome Met, Complete Goal  Goal: # Verbalizes understanding of fall risk/precautions  Description: Document education using the patient education activity  Outcome: Outcome Met, Complete Goal     Problem: VTE, Risk for  Goal: # No s/s of VTE  Outcome: Outcome Met, Complete Goal  Goal: # Verbalizes understanding of VTE risk factors and prevention  Description: Document education using the patient education activity.   Outcome: Outcome Met, Complete Goal      No.

## 2025-06-06 ENCOUNTER — APPOINTMENT (OUTPATIENT)
Dept: NEUROLOGY | Facility: CLINIC | Age: 55
End: 2025-06-06
Payer: MEDICARE

## 2025-06-06 PROCEDURE — 95816 EEG AWAKE AND DROWSY: CPT

## 2025-06-20 ENCOUNTER — APPOINTMENT (OUTPATIENT)
Dept: NEUROLOGY | Facility: CLINIC | Age: 55
End: 2025-06-20

## 2025-06-27 ENCOUNTER — APPOINTMENT (OUTPATIENT)
Dept: VASCULAR SURGERY | Facility: CLINIC | Age: 55
End: 2025-06-27
Payer: MEDICARE

## 2025-06-27 VITALS
WEIGHT: 191 LBS | DIASTOLIC BLOOD PRESSURE: 69 MMHG | HEIGHT: 61 IN | SYSTOLIC BLOOD PRESSURE: 100 MMHG | HEART RATE: 64 BPM | BODY MASS INDEX: 36.06 KG/M2

## 2025-06-27 PROBLEM — M79.604 PAIN IN BOTH LOWER EXTREMITIES: Status: ACTIVE | Noted: 2025-06-27

## 2025-06-27 PROCEDURE — 93970 EXTREMITY STUDY: CPT

## 2025-06-27 PROCEDURE — 99203 OFFICE O/P NEW LOW 30 MIN: CPT

## 2025-06-27 RX ORDER — LAMOTRIGINE 250 MG/1
250 TABLET, FILM COATED, EXTENDED RELEASE ORAL
Refills: 0 | Status: ACTIVE | COMMUNITY

## 2025-06-27 RX ORDER — LEVETIRACETAM 1000 MG/1
1000 TABLET, FILM COATED ORAL
Refills: 0 | Status: ACTIVE | COMMUNITY

## 2025-07-09 ENCOUNTER — APPOINTMENT (OUTPATIENT)
Dept: ORTHOPEDIC SURGERY | Facility: CLINIC | Age: 55
End: 2025-07-09

## 2025-07-18 ENCOUNTER — EMERGENCY (EMERGENCY)
Facility: HOSPITAL | Age: 55
LOS: 0 days | Discharge: HOME | End: 2025-07-18
Attending: EMERGENCY MEDICINE
Payer: MEDICARE

## 2025-07-18 VITALS
RESPIRATION RATE: 18 BRPM | HEART RATE: 98 BPM | DIASTOLIC BLOOD PRESSURE: 90 MMHG | WEIGHT: 199.96 LBS | HEIGHT: 65 IN | SYSTOLIC BLOOD PRESSURE: 138 MMHG | OXYGEN SATURATION: 98 % | TEMPERATURE: 98 F

## 2025-07-18 DIAGNOSIS — Z98.890 OTHER SPECIFIED POSTPROCEDURAL STATES: Chronic | ICD-10-CM

## 2025-07-18 DIAGNOSIS — Z87.442 PERSONAL HISTORY OF URINARY CALCULI: ICD-10-CM

## 2025-07-18 DIAGNOSIS — Z86.73 PERSONAL HISTORY OF TRANSIENT ISCHEMIC ATTACK (TIA), AND CEREBRAL INFARCTION WITHOUT RESIDUAL DEFICITS: ICD-10-CM

## 2025-07-18 DIAGNOSIS — N20.0 CALCULUS OF KIDNEY: ICD-10-CM

## 2025-07-18 DIAGNOSIS — G40.909 EPILEPSY, UNSPECIFIED, NOT INTRACTABLE, WITHOUT STATUS EPILEPTICUS: ICD-10-CM

## 2025-07-18 DIAGNOSIS — D26.9 OTHER BENIGN NEOPLASM OF UTERUS, UNSPECIFIED: Chronic | ICD-10-CM

## 2025-07-18 LAB
ALBUMIN SERPL ELPH-MCNC: 4.1 G/DL — SIGNIFICANT CHANGE UP (ref 3.5–5.2)
ALBUMIN SERPL ELPH-MCNC: 4.2 G/DL — SIGNIFICANT CHANGE UP (ref 3.5–5.2)
ALP SERPL-CCNC: 86 U/L — SIGNIFICANT CHANGE UP (ref 30–115)
ALP SERPL-CCNC: 99 U/L — SIGNIFICANT CHANGE UP (ref 30–115)
ALT FLD-CCNC: 15 U/L — SIGNIFICANT CHANGE UP (ref 0–41)
ALT FLD-CCNC: 16 U/L — SIGNIFICANT CHANGE UP (ref 0–41)
ANION GAP SERPL CALC-SCNC: 12 MMOL/L — SIGNIFICANT CHANGE UP (ref 7–14)
ANION GAP SERPL CALC-SCNC: 9 MMOL/L — SIGNIFICANT CHANGE UP (ref 7–14)
APPEARANCE UR: CLEAR — SIGNIFICANT CHANGE UP
AST SERPL-CCNC: 20 U/L — SIGNIFICANT CHANGE UP (ref 0–41)
AST SERPL-CCNC: 34 U/L — SIGNIFICANT CHANGE UP (ref 0–41)
BASOPHILS # BLD AUTO: 0.03 K/UL — SIGNIFICANT CHANGE UP (ref 0–0.2)
BASOPHILS NFR BLD AUTO: 0.6 % — SIGNIFICANT CHANGE UP (ref 0–1)
BILIRUB SERPL-MCNC: 0.2 MG/DL — SIGNIFICANT CHANGE UP (ref 0.2–1.2)
BILIRUB SERPL-MCNC: <0.2 MG/DL — SIGNIFICANT CHANGE UP (ref 0.2–1.2)
BILIRUB UR-MCNC: NEGATIVE — SIGNIFICANT CHANGE UP
BUN SERPL-MCNC: 23 MG/DL — HIGH (ref 10–20)
BUN SERPL-MCNC: 24 MG/DL — HIGH (ref 10–20)
CALCIUM SERPL-MCNC: 9.5 MG/DL — SIGNIFICANT CHANGE UP (ref 8.4–10.5)
CALCIUM SERPL-MCNC: 9.7 MG/DL — SIGNIFICANT CHANGE UP (ref 8.4–10.5)
CHLORIDE SERPL-SCNC: 101 MMOL/L — SIGNIFICANT CHANGE UP (ref 98–110)
CHLORIDE SERPL-SCNC: 101 MMOL/L — SIGNIFICANT CHANGE UP (ref 98–110)
CO2 SERPL-SCNC: 23 MMOL/L — SIGNIFICANT CHANGE UP (ref 17–32)
CO2 SERPL-SCNC: 26 MMOL/L — SIGNIFICANT CHANGE UP (ref 17–32)
COLOR SPEC: YELLOW — SIGNIFICANT CHANGE UP
CREAT SERPL-MCNC: 1.4 MG/DL — SIGNIFICANT CHANGE UP (ref 0.7–1.5)
CREAT SERPL-MCNC: 1.5 MG/DL — SIGNIFICANT CHANGE UP (ref 0.7–1.5)
DIFF PNL FLD: ABNORMAL
EGFR: 40 ML/MIN/1.73M2 — LOW
EGFR: 40 ML/MIN/1.73M2 — LOW
EGFR: 44 ML/MIN/1.73M2 — LOW
EGFR: 44 ML/MIN/1.73M2 — LOW
EOSINOPHIL # BLD AUTO: 0.05 K/UL — SIGNIFICANT CHANGE UP (ref 0–0.7)
EOSINOPHIL NFR BLD AUTO: 1.1 % — SIGNIFICANT CHANGE UP (ref 0–8)
GLUCOSE SERPL-MCNC: 90 MG/DL — SIGNIFICANT CHANGE UP (ref 70–99)
GLUCOSE SERPL-MCNC: 91 MG/DL — SIGNIFICANT CHANGE UP (ref 70–99)
GLUCOSE UR QL: NEGATIVE MG/DL — SIGNIFICANT CHANGE UP
HCT VFR BLD CALC: 37.6 % — SIGNIFICANT CHANGE UP (ref 37–47)
HGB BLD-MCNC: 12.1 G/DL — SIGNIFICANT CHANGE UP (ref 12–16)
IMM GRANULOCYTES NFR BLD AUTO: 0.2 % — SIGNIFICANT CHANGE UP (ref 0.1–0.3)
KETONES UR QL: NEGATIVE MG/DL — SIGNIFICANT CHANGE UP
LEUKOCYTE ESTERASE UR-ACNC: ABNORMAL
LIDOCAIN IGE QN: 18 U/L — SIGNIFICANT CHANGE UP (ref 7–60)
LYMPHOCYTES # BLD AUTO: 1.52 K/UL — SIGNIFICANT CHANGE UP (ref 1.2–3.4)
LYMPHOCYTES # BLD AUTO: 32.1 % — SIGNIFICANT CHANGE UP (ref 20.5–51.1)
MCHC RBC-ENTMCNC: 28.6 PG — SIGNIFICANT CHANGE UP (ref 27–31)
MCHC RBC-ENTMCNC: 32.2 G/DL — SIGNIFICANT CHANGE UP (ref 32–37)
MCV RBC AUTO: 88.9 FL — SIGNIFICANT CHANGE UP (ref 81–99)
MONOCYTES # BLD AUTO: 0.39 K/UL — SIGNIFICANT CHANGE UP (ref 0.1–0.6)
MONOCYTES NFR BLD AUTO: 8.2 % — SIGNIFICANT CHANGE UP (ref 1.7–9.3)
NEUTROPHILS # BLD AUTO: 2.74 K/UL — SIGNIFICANT CHANGE UP (ref 1.4–6.5)
NEUTROPHILS NFR BLD AUTO: 57.8 % — SIGNIFICANT CHANGE UP (ref 42.2–75.2)
NITRITE UR-MCNC: NEGATIVE — SIGNIFICANT CHANGE UP
NRBC BLD AUTO-RTO: 0 /100 WBCS — SIGNIFICANT CHANGE UP (ref 0–0)
PH UR: 7 — SIGNIFICANT CHANGE UP (ref 5–8)
PLATELET # BLD AUTO: 278 K/UL — SIGNIFICANT CHANGE UP (ref 130–400)
PMV BLD: 10.2 FL — SIGNIFICANT CHANGE UP (ref 7.4–10.4)
POTASSIUM SERPL-MCNC: 5.2 MMOL/L — HIGH (ref 3.5–5)
POTASSIUM SERPL-MCNC: SIGNIFICANT CHANGE UP MMOL/L (ref 3.5–5)
POTASSIUM SERPL-SCNC: 5.2 MMOL/L — HIGH (ref 3.5–5)
POTASSIUM SERPL-SCNC: SIGNIFICANT CHANGE UP MMOL/L (ref 3.5–5)
PROT SERPL-MCNC: 7.1 G/DL — SIGNIFICANT CHANGE UP (ref 6–8)
PROT SERPL-MCNC: 7.3 G/DL — SIGNIFICANT CHANGE UP (ref 6–8)
PROT UR-MCNC: NEGATIVE MG/DL — SIGNIFICANT CHANGE UP
RBC # BLD: 4.23 M/UL — SIGNIFICANT CHANGE UP (ref 4.2–5.4)
RBC # FLD: 12.9 % — SIGNIFICANT CHANGE UP (ref 11.5–14.5)
SODIUM SERPL-SCNC: 136 MMOL/L — SIGNIFICANT CHANGE UP (ref 135–146)
SODIUM SERPL-SCNC: 136 MMOL/L — SIGNIFICANT CHANGE UP (ref 135–146)
SP GR SPEC: 1.02 — SIGNIFICANT CHANGE UP (ref 1–1.03)
UROBILINOGEN FLD QL: 0.2 MG/DL — SIGNIFICANT CHANGE UP (ref 0.2–1)
WBC # BLD: 4.74 K/UL — LOW (ref 4.8–10.8)
WBC # FLD AUTO: 4.74 K/UL — LOW (ref 4.8–10.8)

## 2025-07-18 PROCEDURE — 80053 COMPREHEN METABOLIC PANEL: CPT

## 2025-07-18 PROCEDURE — 74177 CT ABD & PELVIS W/CONTRAST: CPT | Mod: 26

## 2025-07-18 PROCEDURE — 99284 EMERGENCY DEPT VISIT MOD MDM: CPT | Mod: 25

## 2025-07-18 PROCEDURE — 85025 COMPLETE CBC W/AUTO DIFF WBC: CPT

## 2025-07-18 PROCEDURE — 81001 URINALYSIS AUTO W/SCOPE: CPT

## 2025-07-18 PROCEDURE — 36415 COLL VENOUS BLD VENIPUNCTURE: CPT

## 2025-07-18 PROCEDURE — 83690 ASSAY OF LIPASE: CPT

## 2025-07-18 PROCEDURE — 74177 CT ABD & PELVIS W/CONTRAST: CPT

## 2025-07-18 PROCEDURE — 99285 EMERGENCY DEPT VISIT HI MDM: CPT

## 2025-07-18 PROCEDURE — 87186 SC STD MICRODIL/AGAR DIL: CPT

## 2025-07-18 PROCEDURE — 87086 URINE CULTURE/COLONY COUNT: CPT

## 2025-07-18 PROCEDURE — 96374 THER/PROPH/DIAG INJ IV PUSH: CPT | Mod: XU

## 2025-07-18 RX ORDER — KETOROLAC TROMETHAMINE 30 MG/ML
30 INJECTION, SOLUTION INTRAMUSCULAR; INTRAVENOUS ONCE
Refills: 0 | Status: DISCONTINUED | OUTPATIENT
Start: 2025-07-18 | End: 2025-07-18

## 2025-07-18 RX ORDER — KETOROLAC TROMETHAMINE 30 MG/ML
1 INJECTION, SOLUTION INTRAMUSCULAR; INTRAVENOUS
Qty: 9 | Refills: 0
Start: 2025-07-18 | End: 2025-07-20

## 2025-07-18 RX ORDER — TAMSULOSIN HYDROCHLORIDE 0.4 MG/1
1 CAPSULE ORAL
Qty: 3 | Refills: 0
Start: 2025-07-18 | End: 2025-07-20

## 2025-07-18 RX ADMIN — KETOROLAC TROMETHAMINE 30 MILLIGRAM(S): 30 INJECTION, SOLUTION INTRAMUSCULAR; INTRAVENOUS at 12:54

## 2025-07-18 NOTE — ED PROVIDER NOTE - NSFOLLOWUPINSTRUCTIONS_ED_ALL_ED_FT
Our Emergency Department Referral Coordinators will be reaching out to you in the next 24-48 hours from 9:00am to 5:00pm with a follow up appointment. Please expect a phone call from the hospital in that time frame. If you do not receive a call or if you have any questions or concerns, you can reach them at   [phone]     As discussed in emergency department, follow-up with urology referral and primary care doctor within 1 week. Take tamsulosin 0.4 mg once a day at bedtime. Take ketorolac 10 mg every 8 hours as needed for pain.    Kidney Stones  The urinary tract with a close-up of a kidney showing kidney stones.  Kidney stones are rock-like masses that form inside of the kidneys. Kidneys are organs that make pee (urine). A kidney stone may move into other parts of the urinary tract, including:  The tubes that connect the kidneys to the bladder (ureters).  The bladder.  The tube that carries urine out of the body (urethra).  Kidney stones can cause very bad pain and can block the flow of pee. The stone usually leaves your body (passes) through your pee. You may need to have a doctor take out the stone.    What are the causes?  Kidney stones may be caused by:  A condition in which certain glands make too much parathyroid hormone (primary hyperparathyroidism).  A buildup of a type of crystals in the bladder made of a chemical called uric acid. The body makes uric acid when you eat certain foods.  Narrowing (stricture) of one or both of the ureters.  A kidney blockage that you were born with.  Past surgery on the kidney or the ureters, such as gastric bypass surgery.  What increases the risk?  You are more likely to develop this condition if:  You have had a kidney stone in the past.  You have a family history of kidney stones.  You do not drink enough water.  You eat a diet that is high in protein, salt (sodium), or sugar.  You are overweight or very overweight (obese).  What are the signs or symptoms?  Symptoms of a kidney stone may include:  Pain in the side of the belly, right below the ribs (flank pain). Pain usually spreads (radiates) to the groin.  Needing to pee often or right away (urgently).  Pain when going pee (urinating).  Blood in your pee (hematuria).  Feeling like you may vomit (nauseous).  Vomiting.  Fever and chills.  How is this treated?  Treatment depends on the size, location, and makeup of the kidney stones. The stones will often pass out of the body through peeing. You may need to:  Drink more fluid to help pass the stone. In some cases, you may be given fluids through an IV tube put into one of your veins at the hospital.  Take medicine for pain.  Make changes in your diet to help keep kidney stones from coming back.  Sometimes, medical procedures are needed to remove a kidney stone. This may involve:  A procedure to break up kidney stones using a beam of light (laser) or shock waves.  Surgery to remove the kidney stones.  Follow these instructions at home:  Medicines    Take over-the-counter and prescription medicines only as told by your doctor.  Ask your doctor if the medicine prescribed to you requires you to avoid driving or using heavy machinery.  Eating and drinking    Drink enough fluid to keep your pee pale yellow. You may be told to drink at least 8–10 glasses of water each day. This will help you pass the stone.  If told by your doctor, change your diet. This may include:  Limiting how much salt you eat.  Eating more fruits and vegetables.  Limiting how much meat, poultry, fish, and eggs you eat.  Follow instructions from your doctor about eating or drinking restrictions.  General instructions    Collect pee samples as told by your doctor. You may need to collect a pee sample:  24 hours after a stone comes out.  8–12 weeks after a stone comes out, and every 6–12 months after that.  Strain your pee every time you pee (urinate), for as long as told. Use the strainer that your doctor recommends.  Do not throw out the stone. Keep it so that it can be tested by your doctor.  Keep all follow-up visits as told by your doctor. This is important. You may need follow-up tests.  How is this prevented?  A comparison of three sample cups showing dark yellow, yellow, and pale yellow urine.  To prevent another kidney stone:  Drink enough fluid to keep your pee pale yellow. This is the best way to prevent kidney stones.  Eat healthy foods.  Avoid certain foods as told by your doctor. You may be told to eat less protein.  Stay at a healthy weight.  Where to find more information  National Kidney Foundation (NKF): www.kidney.org  [employer] (UCF): www.urologyhealth.org  Contact a doctor if:  You have pain that gets worse or does not get better with medicine.  Get help right away if:  You have a fever or chills.  You get very bad pain.  You get new pain in your belly (abdomen).  You pass out (faint).  You cannot pee.  Summary  Kidney stones are rock-like masses that form inside of the kidneys.  Kidney stones can cause very bad pain and can block the flow of pee.  The stones will often pass out of the body through peeing.  Drink enough fluid to keep your pee pale yellow.  This information is not intended to replace advice given to you by your health care provider. Make sure you discuss any questions you have with your health care provider.

## 2025-07-18 NOTE — ED ADULT NURSE NOTE - NS_SISCREENINGSR_GEN_ALL_ED
Detail Level: Detailed Wound Crusting?: clean Wound Diameter In Cm(Optional): 0 Add 51081 Cpt? (Important Note: In 2017 The Use Of 47462 Is Being Tracked By Cms To Determine Future Global Period Reimbursement For Global Periods): no Negative

## 2025-07-18 NOTE — ED PROVIDER NOTE - PATIENT PORTAL LINK FT
You can access the FollowMyHealth Patient Portal offered by [facility] by registering at the following website: [email or web address]. By joining [employer]’s FollowMyHealth portal, you will also be able to view your health information using other applications (apps) compatible with our system.

## 2025-07-18 NOTE — ED ADULT TRIAGE NOTE - CHIEF COMPLAINT QUOTE
c/o pain to the surgical site . Patient had left stent placed on last Friday in [facility] . Patient did not take any pain meds today

## 2025-07-18 NOTE — ED ADULT NURSE NOTE - OBJECTIVE STATEMENT
Aox4 pt c/opain to post op site which she had done last week. IV placed, labs sent and meds given per provider order

## 2025-07-18 NOTE — ED PROVIDER NOTE - PHYSICAL EXAMINATION
Vital Signs: I have reviewed the initial vital signs.  CONSTITUTIONAL: Pt in no acute distress laying on stretcher.  SKIN: Skin exam is warm and dry, no acute rash.  HEAD: Normocephalic; atraumatic.  EYES: PERRL, EOM intact; conjunctiva and sclera clear.  ENT: No nasal discharge; airway clear.  NECK: Supple; FROM  CARD: S1, S2 normal; no murmurs, gallops, or rubs. Regular rate and rhythm.  RESP: CTAB. No wheezes, rales or rhonchi.  ABD: soft; non-distended, nontender; no hepatosplenomegaly.   MSK: Normal ROM. No clubbing, cyanosis or edema.

## 2025-07-18 NOTE — ED PROVIDER NOTE - OBJECTIVE STATEMENT
55-year-old female past medical history of seizure disorder, kidney stones s/p stent and lithotripsy 1 week ago at [place], presents with left flank pain worsening today.  Patient states she had 2 large kidney stones with lithotripsy done last week, had improvement for a few days before pain returned today.  Denies fever, chest pain, shortness of breath, abdominal pain, vomiting, hematuria, dysuria.

## 2025-07-18 NOTE — ED PROVIDER NOTE - PROGRESS NOTE DETAILS
MT: Patient with multiple kidney stones, measuring up to 0.4 cm. Discussed with patient that this can be consistent with lithotripsy performed 1 week ago. Patient states pain is improved after ED therapies. Attempted to contact patient's urologist at [place] but unable to reach. Patient requesting urology follow-up on [place]. Will discharge with medications and strict return precautions, patient understands and agrees with plan of care.

## 2025-07-18 NOTE — ED PROVIDER NOTE - CLINICAL SUMMARY MEDICAL DECISION MAKING FREE TEXT BOX
55-year-old female with past medical history of seizures, ischemic stroke, kidney stone s/p stents/stent removal/lithotripsy, presents to the emergency department for evaluation of right-sided back/flank pain.  Patient was recently hospitalized for kidney stones and had a complicated course requiring stents and stent removal followed by lithotripsy.  Patient felt better upon discharge approximately 5 days ago, however yesterday began to experience worsening pain again.  Patient tried oral Toradol without significant relief.  Patient has not had any recent fever, headache, dizziness, difficulty breathing, nausea, vomiting, or diarrhea.    PHYSICAL EXAM: I have reviewed current vital signs.  GENERAL: NAD, well-nourished; well-developed.  HEAD:  Normocephalic, atraumatic.  EYES: Conjunctiva and sclera clear.  ENT: MMM, no erythema/exudates.  CHEST/LUNG: Clear to auscultation bilaterally; no wheezes, rales, or rhonchi.  HEART: Regular rate and rhythm, normal S1 and S2; no murmurs, rubs, or gallops.  ABDOMEN: Soft, nontender, nondistended.  Right CVA tenderness.  EXTREMITIES:  2+ peripheral pulses; no clubbing, cyanosis, or edema.  PSYCH: Cooperative, appropriate, normal mood and affect.  NEUROLOGY: A&O x 3.  No focal neurological deficits.  SKIN: Warm and dry.    On reassessment, patient feels significantly better.  Will establish follow-up in [place] with urology.  Results discussed in detail with patient who expressed understanding and is amenable with plan.  Stable for discharge.

## 2025-07-18 NOTE — ED ADULT NURSE NOTE - CHIEF COMPLAINT QUOTE
c/o pain to the surgical site . Patient had left stent placed on last Friday in [facility] . Patient did not take any pain meds today

## 2025-07-18 NOTE — ED ADULT NURSE NOTE - AS PAIN REST
04/20/25 1200   Vital Signs   Pulse 65   Resp 16   SpO2 (!) 92 %   Flow (L/min) (Oxygen Therapy) 6   Device (Oxygen Therapy) nasal cannula   BP (!) 144/63   BP Location Right arm   Patient Position Lying     Pt c/o L side chest pressure while MD rounding. Oxygen 91%n 4L, 92% on 6L. Orders place for stat EKG, cxray, labs, nitro PRN, oxygen via HFNC.     1230: BP 80s/50s after giving nitro x1. Pt reporting no chest discomfort. MD aware.     1344: BP WNL, pt resting w/ no complaints. 97% on 6L HFNC. Lasix given x1 for pulmonary edema.          4 (moderate pain)

## 2025-07-22 ENCOUNTER — APPOINTMENT (OUTPATIENT)
Dept: ORTHOPEDIC SURGERY | Facility: CLINIC | Age: 55
End: 2025-07-22

## 2025-07-22 ENCOUNTER — TRANSCRIPTION ENCOUNTER (OUTPATIENT)
Age: 55
End: 2025-07-22

## 2025-07-22 RX ORDER — LEVOFLOXACIN 25 MG/ML
1 SOLUTION ORAL
Qty: 7 | Refills: 0
Start: 2025-07-22 | End: 2025-07-28

## 2025-07-24 ENCOUNTER — APPOINTMENT (OUTPATIENT)
Dept: UROLOGY | Facility: CLINIC | Age: 55
End: 2025-07-24
Payer: MEDICARE

## 2025-07-24 VITALS
RESPIRATION RATE: 18 BRPM | BODY MASS INDEX: 36.82 KG/M2 | DIASTOLIC BLOOD PRESSURE: 72 MMHG | SYSTOLIC BLOOD PRESSURE: 99 MMHG | WEIGHT: 195 LBS | OXYGEN SATURATION: 100 % | HEIGHT: 61 IN | HEART RATE: 66 BPM

## 2025-07-24 DIAGNOSIS — Z83.3 FAMILY HISTORY OF DIABETES MELLITUS: ICD-10-CM

## 2025-07-24 DIAGNOSIS — N20.1 CALCULUS OF URETER: ICD-10-CM

## 2025-07-24 DIAGNOSIS — Z82.49 FAMILY HISTORY OF ISCHEMIC HEART DISEASE AND OTHER DISEASES OF THE CIRCULATORY SYSTEM: ICD-10-CM

## 2025-07-24 PROCEDURE — 99204 OFFICE O/P NEW MOD 45 MIN: CPT

## 2025-07-24 NOTE — CHART NOTE - NSCHARTNOTEFT_GEN_A_CORE
"[facility] MRN [id number] / Emailed Arnoldo 7/21 - JL / Appointment made - JL / [address] [facility] GIO / KAYKAY MUNGUIA MD / 	Thu 07/24/2025 02:00 PM"    specialty: urology

## 2025-07-30 ENCOUNTER — OUTPATIENT (OUTPATIENT)
Dept: OUTPATIENT SERVICES | Facility: HOSPITAL | Age: 55
LOS: 1 days | End: 2025-07-30
Payer: MEDICARE

## 2025-07-30 ENCOUNTER — RESULT REVIEW (OUTPATIENT)
Age: 55
End: 2025-07-30

## 2025-07-30 DIAGNOSIS — Z00.8 ENCOUNTER FOR OTHER GENERAL EXAMINATION: ICD-10-CM

## 2025-07-30 DIAGNOSIS — Z98.890 OTHER SPECIFIED POSTPROCEDURAL STATES: Chronic | ICD-10-CM

## 2025-07-30 DIAGNOSIS — D26.9 OTHER BENIGN NEOPLASM OF UTERUS, UNSPECIFIED: Chronic | ICD-10-CM

## 2025-07-30 PROCEDURE — 74176 CT ABD & PELVIS W/O CONTRAST: CPT | Mod: 26

## 2025-07-30 PROCEDURE — 74176 CT ABD & PELVIS W/O CONTRAST: CPT

## 2025-07-31 ENCOUNTER — APPOINTMENT (OUTPATIENT)
Dept: UROLOGY | Facility: CLINIC | Age: 55
End: 2025-07-31
Payer: MEDICARE

## 2025-07-31 VITALS
WEIGHT: 195 LBS | RESPIRATION RATE: 18 BRPM | BODY MASS INDEX: 36.82 KG/M2 | HEART RATE: 71 BPM | HEIGHT: 61 IN | SYSTOLIC BLOOD PRESSURE: 96 MMHG | DIASTOLIC BLOOD PRESSURE: 70 MMHG | OXYGEN SATURATION: 97 %

## 2025-07-31 DIAGNOSIS — N20.0 CALCULUS OF KIDNEY: ICD-10-CM

## 2025-07-31 DIAGNOSIS — N20.1 CALCULUS OF URETER: ICD-10-CM

## 2025-07-31 DIAGNOSIS — N13.30 UNSPECIFIED HYDRONEPHROSIS: ICD-10-CM

## 2025-07-31 PROCEDURE — 81003 URINALYSIS AUTO W/O SCOPE: CPT | Mod: QW

## 2025-07-31 PROCEDURE — 99214 OFFICE O/P EST MOD 30 MIN: CPT

## 2025-08-01 ENCOUNTER — APPOINTMENT (OUTPATIENT)
Dept: NEUROLOGY | Facility: CLINIC | Age: 55
End: 2025-08-01

## 2025-08-04 PROBLEM — N20.0 NEPHROLITHIASIS: Status: ACTIVE | Noted: 2025-08-04

## 2025-08-04 PROBLEM — N13.30 HYDRONEPHROSIS: Status: ACTIVE | Noted: 2025-07-25

## 2025-08-04 PROBLEM — N20.1 URETERAL STONE: Status: ACTIVE | Noted: 2025-07-25

## 2025-08-04 LAB
BILIRUB UR QL STRIP: NORMAL
COLLECTION METHOD: NORMAL
GLUCOSE UR-MCNC: NORMAL
HCG UR QL: NORMAL EU/DL
HGB UR QL STRIP.AUTO: NORMAL
KETONES UR-MCNC: NORMAL
LEUKOCYTE ESTERASE UR QL STRIP: NORMAL
NITRITE UR QL STRIP: NORMAL
PH UR STRIP: 5.5
PROT UR STRIP-MCNC: NORMAL
SP GR UR STRIP: 1.02